# Patient Record
Sex: FEMALE | Race: WHITE | Employment: FULL TIME | URBAN - NONMETROPOLITAN AREA
[De-identification: names, ages, dates, MRNs, and addresses within clinical notes are randomized per-mention and may not be internally consistent; named-entity substitution may affect disease eponyms.]

---

## 2017-01-26 ENCOUNTER — OFFICE VISIT (OUTPATIENT)
Dept: FAMILY MEDICINE CLINIC | Facility: CLINIC | Age: 57
End: 2017-01-26

## 2017-01-26 VITALS
OXYGEN SATURATION: 97 % | SYSTOLIC BLOOD PRESSURE: 122 MMHG | TEMPERATURE: 98 F | HEART RATE: 84 BPM | DIASTOLIC BLOOD PRESSURE: 80 MMHG | BODY MASS INDEX: 29 KG/M2 | WEIGHT: 173 LBS

## 2017-01-26 DIAGNOSIS — Z56.6 STRESS AT WORK: ICD-10-CM

## 2017-01-26 DIAGNOSIS — K21.00 GASTROESOPHAGEAL REFLUX DISEASE WITH ESOPHAGITIS: Primary | ICD-10-CM

## 2017-01-26 PROCEDURE — 99213 OFFICE O/P EST LOW 20 MIN: CPT | Performed by: FAMILY MEDICINE

## 2017-01-26 RX ORDER — OMEPRAZOLE 40 MG/1
40 CAPSULE, DELAYED RELEASE ORAL DAILY
Qty: 30 CAPSULE | Refills: 1 | Status: SHIPPED | OUTPATIENT
Start: 2017-01-26 | End: 2018-03-08

## 2017-01-26 SDOH — HEALTH STABILITY - MENTAL HEALTH: OTHER PHYSICAL AND MENTAL STRAIN RELATED TO WORK: Z56.6

## 2017-01-26 NOTE — PATIENT INSTRUCTIONS
Anti-reflux measures reviewed. Avoid large meals. Allow at least 3 hrs between eating and laying down to facilitate gastric emptying. Limit caffeine to 2 servings per day. Avoid spicy or acidic foods and liquids. Moderation of alcohol.   Avoid nsaids an

## 2017-01-26 NOTE — PROGRESS NOTES
HPI:    Patient ID: Cassy Leroy is a 61year old female. Patient presents with:  Abdominal Pain: VOMITING, HEART BURN, GERD---- THINKS POSSIBLE ULCER--    HPI c/o stomach pains x 1 month, \"acidy\",  Pt using TUMS, acid up in throat, worse at night  Vomit reactive to light. No scleral icterus. Neck: Normal range of motion. Neck supple. No thyromegaly present. Cardiovascular: Normal rate, regular rhythm and normal heart sounds. No murmur heard.   Pulmonary/Chest: Effort normal and breath sounds normal.

## 2017-10-06 ENCOUNTER — TELEPHONE (OUTPATIENT)
Dept: FAMILY MEDICINE CLINIC | Facility: CLINIC | Age: 57
End: 2017-10-06

## 2017-10-06 NOTE — TELEPHONE ENCOUNTER
Mammogram normal.  Repeat in one year. Continue monthly self breast exams and follow up with any palpable or visible abnormality. Patient notified of results and recommendations.

## 2017-10-07 ENCOUNTER — OFFICE VISIT (OUTPATIENT)
Dept: FAMILY MEDICINE CLINIC | Facility: CLINIC | Age: 57
End: 2017-10-07

## 2017-10-07 VITALS
SYSTOLIC BLOOD PRESSURE: 122 MMHG | HEART RATE: 76 BPM | TEMPERATURE: 99 F | DIASTOLIC BLOOD PRESSURE: 72 MMHG | WEIGHT: 178 LBS | OXYGEN SATURATION: 98 % | BODY MASS INDEX: 30.02 KG/M2 | HEIGHT: 64.5 IN

## 2017-10-07 DIAGNOSIS — E55.9 VITAMIN D DEFICIENCY: ICD-10-CM

## 2017-10-07 DIAGNOSIS — Z01.419 WELL WOMAN EXAM WITH ROUTINE GYNECOLOGICAL EXAM: Primary | ICD-10-CM

## 2017-10-07 DIAGNOSIS — J30.1 CHRONIC SEASONAL ALLERGIC RHINITIS DUE TO POLLEN: ICD-10-CM

## 2017-10-07 DIAGNOSIS — L71.9 ROSACEA, ACNE: ICD-10-CM

## 2017-10-07 DIAGNOSIS — Z78.0 MENOPAUSE: ICD-10-CM

## 2017-10-07 DIAGNOSIS — I87.2 VENOUS INSUFFICIENCY OF BOTH LOWER EXTREMITIES: ICD-10-CM

## 2017-10-07 DIAGNOSIS — E03.9 ACQUIRED HYPOTHYROIDISM: ICD-10-CM

## 2017-10-07 DIAGNOSIS — K21.00 GASTROESOPHAGEAL REFLUX DISEASE WITH ESOPHAGITIS: ICD-10-CM

## 2017-10-07 DIAGNOSIS — G43.009 MIGRAINE WITHOUT AURA AND WITHOUT STATUS MIGRAINOSUS, NOT INTRACTABLE: ICD-10-CM

## 2017-10-07 PROCEDURE — 36415 COLL VENOUS BLD VENIPUNCTURE: CPT | Performed by: FAMILY MEDICINE

## 2017-10-07 PROCEDURE — 88175 CYTOPATH C/V AUTO FLUID REDO: CPT | Performed by: FAMILY MEDICINE

## 2017-10-07 PROCEDURE — 81003 URINALYSIS AUTO W/O SCOPE: CPT | Performed by: FAMILY MEDICINE

## 2017-10-07 PROCEDURE — 80050 GENERAL HEALTH PANEL: CPT | Performed by: FAMILY MEDICINE

## 2017-10-07 PROCEDURE — 82306 VITAMIN D 25 HYDROXY: CPT | Performed by: FAMILY MEDICINE

## 2017-10-07 PROCEDURE — 80061 LIPID PANEL: CPT | Performed by: FAMILY MEDICINE

## 2017-10-07 PROCEDURE — 99396 PREV VISIT EST AGE 40-64: CPT | Performed by: FAMILY MEDICINE

## 2017-10-07 RX ORDER — DOXYCYCLINE HYCLATE 100 MG/1
100 TABLET, DELAYED RELEASE ORAL 2 TIMES DAILY
Qty: 60 TABLET | Refills: 0 | Status: SHIPPED | OUTPATIENT
Start: 2017-10-07 | End: 2018-03-08

## 2017-10-07 RX ORDER — MONTELUKAST SODIUM 10 MG/1
10 TABLET ORAL DAILY
Qty: 90 TABLET | Refills: 3 | Status: SHIPPED | OUTPATIENT
Start: 2017-10-07 | End: 2018-10-02

## 2017-10-07 NOTE — H&P
HPI:   Cristóbal Hernandez is a 61year old female who presents for a complete physical exam. Symptoms: is menopausal. Patient complains of needing annual physical.  Doing better on omeprazole. Allergies flared up. On allegra D , nasacort and saline nasally.   Ro daily. Disp: 1 Inhaler Rfl: 0   Triamcinolone Acetonide (NASACORT AQ NA) by Nasal route.  Disp:  Rfl:       Past Medical History:   Diagnosis Date   • Acne rosacea 11/2/2012   • Allergic rhinitis 11/2/2012   • Migraines 11/2/2012   • Postmenopausal atrophic rosacea  HEENT: atraumatic, normocephalic,ears and throat are clear, nares pale boggy with clear secretions  EYES:PERRLA, EOMI, conjunctiva are clear  NECK: supple,no adenopathy,no bruits  CHEST: no chest tenderness  BREAST: no dominant or suspicious mass acne  - acne facial washes  - stop  Rubbing alcohol to face which can exacerbate  - Doxycycline Hyclate 100 MG Oral Tab EC; Take 1 tablet (100 mg total) by mouth 2 (two) times daily. Dispense: 60 tablet;  Refill: 0      Pt info handouts given for: exercise

## 2017-10-07 NOTE — PATIENT INSTRUCTIONS
Clinical Breast Exam    Many health organizations recommend a yearly clinical breast exam. This exam may be done by a gynecologist, family healthcare provider, nurse practitioner, or specially trained nurse.  Yearly breast exams help to make sure that mahesh · Not being active  · Drinking too much alcohol  · Having dense breast tissue  · Taking hormone therapy after menopause  Other health organizations have different recommendations. Talk to your healthcare provider about what is best for you.    Date Last Rev

## 2018-03-08 ENCOUNTER — OFFICE VISIT (OUTPATIENT)
Dept: FAMILY MEDICINE CLINIC | Facility: CLINIC | Age: 58
End: 2018-03-08

## 2018-03-08 VITALS
HEIGHT: 64.5 IN | SYSTOLIC BLOOD PRESSURE: 120 MMHG | WEIGHT: 180 LBS | TEMPERATURE: 98 F | BODY MASS INDEX: 30.36 KG/M2 | HEART RATE: 72 BPM | DIASTOLIC BLOOD PRESSURE: 68 MMHG | OXYGEN SATURATION: 98 %

## 2018-03-08 DIAGNOSIS — H93.13 TINNITUS OF BOTH EARS: ICD-10-CM

## 2018-03-08 DIAGNOSIS — M26.609 TMJ DYSFUNCTION: Primary | ICD-10-CM

## 2018-03-08 DIAGNOSIS — B07.8 OTHER VIRAL WARTS: ICD-10-CM

## 2018-03-08 PROCEDURE — 99213 OFFICE O/P EST LOW 20 MIN: CPT | Performed by: FAMILY MEDICINE

## 2018-03-08 PROCEDURE — 17110 DESTRUCTION B9 LES UP TO 14: CPT | Performed by: FAMILY MEDICINE

## 2018-03-08 NOTE — PROGRESS NOTES
HPI:    Patient ID: Nallely Wharton is a 61year old female.   Patient presents with:  Ear Pain: BILATERAL EAR PAIN, HA, RINGING IN EARS  Warts: ON HANDS    HPI c/o intermittent sharp pains in both ears x several weeks, no recent colds, +chronic allergies,get Tinnitus of both ears  Other viral warts-overlying callus debrided and warts treated with liquid nitrogen  Patient Instructions   Discussed the findings of bilateral TMJ dysfunction.   Would recommend soft diet, avoid gum or other form foods that require gr

## 2018-03-08 NOTE — PATIENT INSTRUCTIONS
Discussed the findings of bilateral TMJ dysfunction. Would recommend soft diet, avoid gum or other form foods that require grinding of teeth, he used warm compresses and ibuprofen as needed.   If symptoms persist follow-up with dentist  Discussed the cause

## 2018-03-30 ENCOUNTER — MED REC SCAN ONLY (OUTPATIENT)
Dept: FAMILY MEDICINE CLINIC | Facility: CLINIC | Age: 58
End: 2018-03-30

## 2018-08-10 ENCOUNTER — OFFICE VISIT (OUTPATIENT)
Dept: FAMILY MEDICINE CLINIC | Facility: CLINIC | Age: 58
End: 2018-08-10
Payer: COMMERCIAL

## 2018-08-10 VITALS
WEIGHT: 183.38 LBS | SYSTOLIC BLOOD PRESSURE: 110 MMHG | BODY MASS INDEX: 31 KG/M2 | TEMPERATURE: 99 F | OXYGEN SATURATION: 97 % | DIASTOLIC BLOOD PRESSURE: 74 MMHG | HEART RATE: 71 BPM

## 2018-08-10 DIAGNOSIS — J30.89 SEASONAL ALLERGIC RHINITIS DUE TO OTHER ALLERGIC TRIGGER: Primary | ICD-10-CM

## 2018-08-10 DIAGNOSIS — J45.901 BRONCHITIS, ALLERGIC, UNSPECIFIED ASTHMA SEVERITY, WITH ACUTE EXACERBATION: ICD-10-CM

## 2018-08-10 PROCEDURE — 99214 OFFICE O/P EST MOD 30 MIN: CPT | Performed by: FAMILY MEDICINE

## 2018-08-10 RX ORDER — PREDNISONE 20 MG/1
20 TABLET ORAL 2 TIMES DAILY
Qty: 14 TABLET | Refills: 0 | Status: SHIPPED | OUTPATIENT
Start: 2018-08-10 | End: 2018-08-17 | Stop reason: ALTCHOICE

## 2018-08-10 RX ORDER — BUDESONIDE AND FORMOTEROL FUMARATE DIHYDRATE 160; 4.5 UG/1; UG/1
2 AEROSOL RESPIRATORY (INHALATION) 2 TIMES DAILY
Qty: 1 INHALER | COMMUNITY
Start: 2018-08-10 | End: 2018-08-17

## 2018-08-10 NOTE — PROGRESS NOTES
Devin Mayer is a 60year old female. Patient presents with: Other: head congestion, headaches, trouble breathing-started approx 1.5 wks ago-has been taking cold and sinus OTC. ...room 2      HPI:   Patient had been on Singulair and Allegra and Asmanex. REVIEW OF SYSTEMS:   GENERAL HEALTH: feels well otherwise  SKIN: denies any unusual skin lesions or rashes  RESPIRATORY: denies shortness of breath   CARDIOVASCULAR: denies chest pain   GI: denies nausea, vomiting, diarrhea or abdominal pain   NEURO: d combinations, may need formal allergy testing. No orders of the defined types were placed in this encounter.       Meds & Refills for this Visit:  Signed Prescriptions Disp Refills    predniSONE 20 MG Oral Tab 14 tablet 0      Sig: Take 1 tablet (20 mg t

## 2018-08-15 ENCOUNTER — TELEPHONE (OUTPATIENT)
Dept: FAMILY MEDICINE CLINIC | Facility: CLINIC | Age: 58
End: 2018-08-15

## 2018-08-16 NOTE — TELEPHONE ENCOUNTER
Returned phone call to patient, she since starting Symbicort she has had a sore throat in increased throat congestion, and home peak flows are in the low 400's.  She states she has decreased the Symbicort to 1 puff BID. she would like to know if there is so

## 2018-08-16 NOTE — TELEPHONE ENCOUNTER
Called and spoke to patient notified of information, she would like to schedule appt to see Dr. Jaswinder Valdez. appt booked.   Future Appointments  Date Time Provider Stepan Ward   8/17/2018 3:30 PM Bee Pineda DO [facility]

## 2018-08-16 NOTE — TELEPHONE ENCOUNTER
She should stop the Symbicort. She should make an appointment to be seen to reevaluate and try to find a different medication.

## 2018-08-17 ENCOUNTER — OFFICE VISIT (OUTPATIENT)
Dept: FAMILY MEDICINE CLINIC | Facility: CLINIC | Age: 58
End: 2018-08-17
Payer: COMMERCIAL

## 2018-08-17 VITALS
BODY MASS INDEX: 31 KG/M2 | WEIGHT: 182.5 LBS | HEART RATE: 84 BPM | DIASTOLIC BLOOD PRESSURE: 80 MMHG | SYSTOLIC BLOOD PRESSURE: 130 MMHG | OXYGEN SATURATION: 98 % | TEMPERATURE: 98 F

## 2018-08-17 DIAGNOSIS — J45.909 BRONCHITIS, ALLERGIC, UNSPECIFIED ASTHMA SEVERITY, UNCOMPLICATED: Primary | ICD-10-CM

## 2018-08-17 PROCEDURE — 99213 OFFICE O/P EST LOW 20 MIN: CPT | Performed by: FAMILY MEDICINE

## 2018-08-17 NOTE — PROGRESS NOTES
Marlo Carter is a 60year old female. Patient presents with: Other: pt feels inhaler was contributing to symptoms. . sore  throat; chest congestion. . started Monday. . room 1      HPI:   Patient feels much better since stopping the Symbicort.   Cough is go Pulse 84   Temp 98.3 °F (36.8 °C) (Temporal)   Wt 182 lb 8 oz   SpO2 98%   BMI 30.84 kg/m²   GENERAL: well developed, well nourished,in no apparent distress  SKIN: no rashes,no suspicious lesions  HEENT: atraumatic, normocephalic, R TM normal, L TM normal,

## 2018-10-17 ENCOUNTER — LABORATORY ENCOUNTER (OUTPATIENT)
Dept: LAB | Age: 58
End: 2018-10-17
Attending: FAMILY MEDICINE
Payer: COMMERCIAL

## 2018-10-17 ENCOUNTER — OFFICE VISIT (OUTPATIENT)
Dept: FAMILY MEDICINE CLINIC | Facility: CLINIC | Age: 58
End: 2018-10-17
Payer: COMMERCIAL

## 2018-10-17 VITALS
WEIGHT: 185.38 LBS | HEIGHT: 65.25 IN | SYSTOLIC BLOOD PRESSURE: 118 MMHG | OXYGEN SATURATION: 95 % | BODY MASS INDEX: 30.52 KG/M2 | HEART RATE: 71 BPM | DIASTOLIC BLOOD PRESSURE: 80 MMHG | TEMPERATURE: 99 F

## 2018-10-17 DIAGNOSIS — Z78.0 MENOPAUSE: ICD-10-CM

## 2018-10-17 DIAGNOSIS — Z01.419 WELL WOMAN EXAM WITH ROUTINE GYNECOLOGICAL EXAM: ICD-10-CM

## 2018-10-17 DIAGNOSIS — E03.9 ACQUIRED HYPOTHYROIDISM: ICD-10-CM

## 2018-10-17 DIAGNOSIS — Z01.419 WELL WOMAN EXAM WITH ROUTINE GYNECOLOGICAL EXAM: Primary | ICD-10-CM

## 2018-10-17 DIAGNOSIS — G43.009 MIGRAINE WITHOUT AURA AND WITHOUT STATUS MIGRAINOSUS, NOT INTRACTABLE: ICD-10-CM

## 2018-10-17 DIAGNOSIS — M26.609 TMJ DYSFUNCTION: ICD-10-CM

## 2018-10-17 DIAGNOSIS — J30.1 CHRONIC SEASONAL ALLERGIC RHINITIS DUE TO POLLEN: ICD-10-CM

## 2018-10-17 DIAGNOSIS — N95.2 POSTMENOPAUSAL ATROPHIC VAGINITIS: ICD-10-CM

## 2018-10-17 PROCEDURE — 81001 URINALYSIS AUTO W/SCOPE: CPT | Performed by: FAMILY MEDICINE

## 2018-10-17 PROCEDURE — 82306 VITAMIN D 25 HYDROXY: CPT | Performed by: FAMILY MEDICINE

## 2018-10-17 PROCEDURE — 80050 GENERAL HEALTH PANEL: CPT | Performed by: FAMILY MEDICINE

## 2018-10-17 PROCEDURE — 36415 COLL VENOUS BLD VENIPUNCTURE: CPT | Performed by: FAMILY MEDICINE

## 2018-10-17 PROCEDURE — 99396 PREV VISIT EST AGE 40-64: CPT | Performed by: FAMILY MEDICINE

## 2018-10-17 PROCEDURE — 80061 LIPID PANEL: CPT | Performed by: FAMILY MEDICINE

## 2018-10-17 NOTE — PATIENT INSTRUCTIONS
Understanding Menopause  Menopause marks the point where you’ve gone 12 months in a row without a period. The average age for this is around 46, but it can happen at younger or older ages.  During the months or years before menopause, your body goes throu · Hot flashes. Wear layers that you can remove. Try all-cotton clothing, sheets, and blankets. Keep a glass of cold water by your bed. · Pain during sex. You can buy a water-based lubricant or vaginal moisturizer in the drugstore that may help.  Your healt · Hormone therapy (HT). HT increases the amount of estrogen in your body. This can help manage or relieve symptoms. HT can be given in pill form. It may be given as a lotion, cream, ring put into the vagina, or a patch on the skin.  The risks and benefits o © 8184-6618 [address]. [address] [facility], [facility] [place]. All rights reserved. This information is not intended as a substitute for professional medical care. Always follow your healthcare professional's instructions.

## 2018-10-17 NOTE — H&P
HPI:   Raymundo Garcia is a 60year old female who presents for a complete physical exam. Symptoms: is menopausal. Patient complains of needing flu shot no other concerns,.   Gets shot therapy      Immunization History  Administered            Date(s) Adminis Hypothyroidism   • Migraines Mother    • Hypertension Father    • Diabetes Father    • Other (gerd/ Morales's esophagus) Father    • Other (CAD) Other    • Heart Attack Other         MI   • Other (Germinoma) Other    • Migraines Sister         1 si not tender,FROM of the back  EXTREMITIES: no cyanosis, clubbing or edema  NEURO: Oriented times three,cranial nerves are intact,motor and sensory are grossly intact    ASSESSMENT AND PLAN:   Geovani Harvey is a 60year old female who presents for a complete

## 2018-10-19 ENCOUNTER — TELEPHONE (OUTPATIENT)
Dept: FAMILY MEDICINE CLINIC | Facility: CLINIC | Age: 58
End: 2018-10-19

## 2018-10-19 NOTE — TELEPHONE ENCOUNTER
----- Message from Tiffanie Gonzalez sent at 10/19/2018  4:11 PM CDT -----  Contact: PATIENT  LISANDRO-   RETURNED A CALL  [phone]      [facility] for the pt. catia

## 2018-11-20 ENCOUNTER — HOSPITAL ENCOUNTER (OUTPATIENT)
Dept: BONE DENSITY | Age: 58
Discharge: HOME | End: 2018-11-20
Attending: FAMILY MEDICINE
Payer: COMMERCIAL

## 2018-11-20 DIAGNOSIS — N95.2 POSTMENOPAUSAL ATROPHIC VAGINITIS: ICD-10-CM

## 2018-11-20 DIAGNOSIS — Z78.0 MENOPAUSE: ICD-10-CM

## 2018-11-20 PROCEDURE — 77080 DXA BONE DENSITY AXIAL: CPT | Performed by: FAMILY MEDICINE

## 2019-02-07 ENCOUNTER — TELEPHONE (OUTPATIENT)
Dept: FAMILY MEDICINE CLINIC | Facility: CLINIC | Age: 59
End: 2019-02-07

## 2019-02-07 NOTE — TELEPHONE ENCOUNTER
How about if I evaluate her first before we order a test.  Please have patient schedule an appointment

## 2019-02-07 NOTE — TELEPHONE ENCOUNTER
I don't see that she has been evaluated by you for this knee pain. Would like me to call her and have her schedule an appt? Please review. Thank you.

## 2019-02-11 ENCOUNTER — TELEPHONE (OUTPATIENT)
Dept: FAMILY MEDICINE CLINIC | Facility: CLINIC | Age: 59
End: 2019-02-11

## 2019-02-11 NOTE — TELEPHONE ENCOUNTER
PT HAVING PROBLEMS WITH KNEE SINCE MAY. SHE SAW A CHIROPRACTOR FOR SEVERAL MONTHS AND HE SUGGESTED GETTING AN MRI - IT MAY BE A MENISCUS PROBLEM. PT WANTS NEED TO MAKE AN APPT FIRST W/ DR Cristobal Bejarano TO GET AN MRI AND WHICH TYPE?   PLEASE CALL

## 2019-02-11 NOTE — TELEPHONE ENCOUNTER
L/m that this was addressed in a note last week.  DR. Triston Banegas SAID TO SCHEDULE AN OV FOR EVAL

## 2019-02-20 ENCOUNTER — HOSPITAL ENCOUNTER (OUTPATIENT)
Dept: GENERAL RADIOLOGY | Age: 59
Discharge: HOME | End: 2019-02-20
Attending: FAMILY MEDICINE
Payer: COMMERCIAL

## 2019-02-20 ENCOUNTER — OFFICE VISIT (OUTPATIENT)
Dept: FAMILY MEDICINE CLINIC | Facility: CLINIC | Age: 59
End: 2019-02-20
Payer: COMMERCIAL

## 2019-02-20 VITALS
SYSTOLIC BLOOD PRESSURE: 118 MMHG | WEIGHT: 185 LBS | HEART RATE: 68 BPM | BODY MASS INDEX: 30.82 KG/M2 | TEMPERATURE: 98 F | HEIGHT: 65 IN | OXYGEN SATURATION: 98 % | DIASTOLIC BLOOD PRESSURE: 80 MMHG

## 2019-02-20 DIAGNOSIS — G89.29 CHRONIC PAIN OF RIGHT KNEE: Primary | ICD-10-CM

## 2019-02-20 DIAGNOSIS — M25.561 CHRONIC PAIN OF RIGHT KNEE: ICD-10-CM

## 2019-02-20 DIAGNOSIS — M25.561 PATELLOFEMORAL ARTHRALGIA OF RIGHT KNEE: ICD-10-CM

## 2019-02-20 DIAGNOSIS — G89.29 CHRONIC PAIN OF RIGHT KNEE: ICD-10-CM

## 2019-02-20 DIAGNOSIS — M17.11 PRIMARY OSTEOARTHRITIS OF RIGHT KNEE: ICD-10-CM

## 2019-02-20 DIAGNOSIS — M25.561 CHRONIC PAIN OF RIGHT KNEE: Primary | ICD-10-CM

## 2019-02-20 PROCEDURE — 99213 OFFICE O/P EST LOW 20 MIN: CPT | Performed by: FAMILY MEDICINE

## 2019-02-20 PROCEDURE — 73562 X-RAY EXAM OF KNEE 3: CPT | Performed by: FAMILY MEDICINE

## 2019-02-20 NOTE — PROGRESS NOTES
HPI:    Patient ID: Emily Garcia is a 60year old female. Patient presents with:  Knee Pain: can't bend completely, exercising make's it worse.     Pt states she fell in May at school, hard to bend right knee all the way, pain is medial  Pt states she f person, place, and time. No sensory deficit. Reflex Scores:       Patellar reflexes are 2+ on the right side and 2+ on the left side. Achilles reflexes are 2+ on the right side and 2+ on the left side.   X-ray right knee:   Impression     CONCLUSION

## 2019-02-21 NOTE — PATIENT INSTRUCTIONS
I reviewed x-ray findings. I advised patient that she is likely having symptoms related to osteoarthritis and popliteal swelling.   There is also degree of patellofemoral arthritis  Patient was instructed in straight leg raising in short arc extension exer

## 2019-03-07 ENCOUNTER — OFFICE VISIT (OUTPATIENT)
Dept: FAMILY MEDICINE CLINIC | Facility: CLINIC | Age: 59
End: 2019-03-07
Payer: COMMERCIAL

## 2019-03-07 VITALS
SYSTOLIC BLOOD PRESSURE: 128 MMHG | TEMPERATURE: 99 F | DIASTOLIC BLOOD PRESSURE: 78 MMHG | OXYGEN SATURATION: 99 % | HEART RATE: 68 BPM | BODY MASS INDEX: 31 KG/M2 | WEIGHT: 184.5 LBS

## 2019-03-07 DIAGNOSIS — G56.21 ULNAR NEUROPATHY OF RIGHT UPPER EXTREMITY: Primary | ICD-10-CM

## 2019-03-07 PROCEDURE — 99213 OFFICE O/P EST LOW 20 MIN: CPT | Performed by: FAMILY MEDICINE

## 2019-03-07 RX ORDER — PREDNISONE 20 MG/1
TABLET ORAL
Qty: 30 TABLET | Refills: 0 | Status: SHIPPED | OUTPATIENT
Start: 2019-03-07 | End: 2019-04-01 | Stop reason: ALTCHOICE

## 2019-03-07 NOTE — PROGRESS NOTES
Adelia Payne is a 60year old female. Patient presents with:  Wrist Pain: wrist pain started yesterday. .. numbess and pain in right hand. . when pt turned steerling wheels. ..room 4    Subjective   HPI:   Patient has issues right hand  Had been using the c 128/78  02/20/19 : 118/80  12/27/18 : 120/81  10/17/18 : 118/80  08/17/18 : 130/80  08/10/18 : 110/74      Wt Readings from Last 6 Encounters:  03/07/19 : 184 lb 8 oz  02/20/19 : 185 lb  12/27/18 : 184 lb  10/17/18 : 185 lb 6 oz  08/17/18 : 182 lb 8 oz  08

## 2019-04-01 ENCOUNTER — OFFICE VISIT (OUTPATIENT)
Dept: FAMILY MEDICINE CLINIC | Facility: CLINIC | Age: 59
End: 2019-04-01
Payer: COMMERCIAL

## 2019-04-01 VITALS
SYSTOLIC BLOOD PRESSURE: 118 MMHG | BODY MASS INDEX: 30.66 KG/M2 | DIASTOLIC BLOOD PRESSURE: 72 MMHG | RESPIRATION RATE: 16 BRPM | HEIGHT: 65 IN | TEMPERATURE: 98 F | HEART RATE: 70 BPM | WEIGHT: 184 LBS

## 2019-04-01 DIAGNOSIS — M26.609 TMJ DYSFUNCTION: ICD-10-CM

## 2019-04-01 DIAGNOSIS — E66.9 OBESITY (BMI 30.0-34.9): ICD-10-CM

## 2019-04-01 DIAGNOSIS — D72.829 LEUKOCYTOSIS, UNSPECIFIED TYPE: ICD-10-CM

## 2019-04-01 DIAGNOSIS — G44.209 MUSCLE TENSION HEADACHE: ICD-10-CM

## 2019-04-01 DIAGNOSIS — J02.9 SORE THROAT: Primary | ICD-10-CM

## 2019-04-01 DIAGNOSIS — R73.03 PRE-DIABETES: ICD-10-CM

## 2019-04-01 PROCEDURE — 87880 STREP A ASSAY W/OPTIC: CPT | Performed by: FAMILY MEDICINE

## 2019-04-01 PROCEDURE — 87081 CULTURE SCREEN ONLY: CPT | Performed by: FAMILY MEDICINE

## 2019-04-01 PROCEDURE — 99214 OFFICE O/P EST MOD 30 MIN: CPT | Performed by: FAMILY MEDICINE

## 2019-04-01 NOTE — PROGRESS NOTES
Marlo Carter is a 60year old female. Patient presents with: Follow - Up: patient is here to review recent labs.        HPI:   Pt had labs done 3/20/19  Pt reports she didn't feel good the day of lab draw  Now c/o st x several days, no fever, + chills, no with exertion,cough, wheezing  CARDIOVASCULAR: denies chest pain on exertion, edema  GI: denies abdominal pain and denies heartburn  NEURO: +freq temporal headaches  PSYCH: + stressed and anxious    EXAM:   /72   Pulse 70   Temp 97.5 °F (36.4 °C) (Te 6.0.  I discussed the importance of lower carbohydrate food choices, eating behavior modification and daily aerobic activity with a goal of 15 pound weight loss over the next 3 months. I have asked the patient to repeat hemoglobin A1c in 3-6 months.   I re

## 2019-04-01 NOTE — PATIENT INSTRUCTIONS
I reviewed the results of patient's recent labs. I discussed the elevated white count possibly being related to con committed illness at the time of blood draw. I reviewed goals for lipids.   I advised patient she has a very favorable of total to good cho

## 2019-04-02 ENCOUNTER — MED REC SCAN ONLY (OUTPATIENT)
Dept: FAMILY MEDICINE CLINIC | Facility: CLINIC | Age: 59
End: 2019-04-02

## 2019-04-03 RX ORDER — AMOXICILLIN 875 MG/1
875 TABLET, COATED ORAL 2 TIMES DAILY
Qty: 20 TABLET | Refills: 0 | Status: SHIPPED | OUTPATIENT
Start: 2019-04-03 | End: 2019-04-13

## 2019-06-14 PROBLEM — D12.3 BENIGN NEOPLASM OF TRANSVERSE COLON: Status: ACTIVE | Noted: 2019-06-14

## 2019-06-14 PROBLEM — K64.8 OTHER HEMORRHOIDS: Status: ACTIVE | Noted: 2019-06-14

## 2019-06-14 PROBLEM — K21.9 GASTROESOPHAGEAL REFLUX DISEASE WITHOUT ESOPHAGITIS: Status: ACTIVE | Noted: 2019-06-14

## 2019-06-14 PROBLEM — Z83.71 FAMILY HISTORY OF COLONIC POLYPS: Status: ACTIVE | Noted: 2019-06-14

## 2019-06-14 PROBLEM — K63.5 POLYP OF COLON: Status: ACTIVE | Noted: 2019-06-14

## 2019-06-14 PROBLEM — D12.8 BENIGN NEOPLASM OF RECTUM: Status: ACTIVE | Noted: 2019-06-14

## 2019-06-14 PROBLEM — D12.5 BENIGN NEOPLASM OF SIGMOID COLON: Status: ACTIVE | Noted: 2019-06-14

## 2019-06-14 PROBLEM — Z83.719 FAMILY HISTORY OF COLONIC POLYPS: Status: ACTIVE | Noted: 2019-06-14

## 2019-06-14 PROBLEM — Z80.0 FAMILY HISTORY OF MALIGNANT NEOPLASM OF DIGESTIVE ORGAN: Status: ACTIVE | Noted: 2019-06-14

## 2019-08-02 ENCOUNTER — LABORATORY ENCOUNTER (OUTPATIENT)
Dept: LAB | Age: 59
End: 2019-08-02
Attending: FAMILY MEDICINE
Payer: COMMERCIAL

## 2019-08-02 ENCOUNTER — OFFICE VISIT (OUTPATIENT)
Dept: FAMILY MEDICINE CLINIC | Facility: CLINIC | Age: 59
End: 2019-08-02
Payer: COMMERCIAL

## 2019-08-02 VITALS
RESPIRATION RATE: 16 BRPM | SYSTOLIC BLOOD PRESSURE: 118 MMHG | HEART RATE: 80 BPM | OXYGEN SATURATION: 98 % | WEIGHT: 177 LBS | BODY MASS INDEX: 29.49 KG/M2 | HEIGHT: 65 IN | DIASTOLIC BLOOD PRESSURE: 70 MMHG | TEMPERATURE: 98 F

## 2019-08-02 DIAGNOSIS — D72.829 LEUKOCYTOSIS, UNSPECIFIED TYPE: ICD-10-CM

## 2019-08-02 DIAGNOSIS — R73.03 PRE-DIABETES: Primary | ICD-10-CM

## 2019-08-02 DIAGNOSIS — D72.829 LEUKOCYTOSIS (LEUCOCYTOSIS): Primary | ICD-10-CM

## 2019-08-02 LAB
BASOPHILS # BLD AUTO: 0.06 X10(3) UL (ref 0–0.2)
BASOPHILS NFR BLD AUTO: 1 %
DEPRECATED RDW RBC AUTO: 43.6 FL (ref 35.1–46.3)
EOSINOPHIL # BLD AUTO: 0.13 X10(3) UL (ref 0–0.7)
EOSINOPHIL NFR BLD AUTO: 2.2 %
ERYTHROCYTE [DISTWIDTH] IN BLOOD BY AUTOMATED COUNT: 13.2 % (ref 11–15)
EST. AVERAGE GLUCOSE BLD GHB EST-MCNC: 126 MG/DL (ref 68–126)
HBA1C MFR BLD HPLC: 6 % (ref ?–5.7)
HCT VFR BLD AUTO: 43.1 % (ref 35–48)
HGB BLD-MCNC: 14.2 G/DL (ref 12–16)
HGBA1C: 6
IMM GRANULOCYTES # BLD AUTO: 0 X10(3) UL (ref 0–1)
IMM GRANULOCYTES NFR BLD: 0 %
LYMPHOCYTES # BLD AUTO: 1.48 X10(3) UL (ref 1–4)
LYMPHOCYTES NFR BLD AUTO: 24.8 %
MCH RBC QN AUTO: 30 PG (ref 26–34)
MCHC RBC AUTO-ENTMCNC: 32.9 G/DL (ref 31–37)
MCV RBC AUTO: 90.9 FL (ref 80–100)
MONOCYTES # BLD AUTO: 0.43 X10(3) UL (ref 0.1–1)
MONOCYTES NFR BLD AUTO: 7.2 %
NEUTROPHILS # BLD AUTO: 3.86 X10 (3) UL (ref 1.5–7.7)
NEUTROPHILS # BLD AUTO: 3.86 X10(3) UL (ref 1.5–7.7)
NEUTROPHILS NFR BLD AUTO: 64.8 %
PLATELET # BLD AUTO: 317 10(3)UL (ref 150–450)
RBC # BLD AUTO: 4.74 X10(6)UL (ref 3.8–5.3)
WBC # BLD AUTO: 6 X10(3) UL (ref 4–11)

## 2019-08-02 PROCEDURE — 36415 COLL VENOUS BLD VENIPUNCTURE: CPT | Performed by: FAMILY MEDICINE

## 2019-08-02 PROCEDURE — 85025 COMPLETE CBC W/AUTO DIFF WBC: CPT | Performed by: FAMILY MEDICINE

## 2019-08-02 PROCEDURE — 99213 OFFICE O/P EST LOW 20 MIN: CPT | Performed by: FAMILY MEDICINE

## 2019-08-02 PROCEDURE — 83036 HEMOGLOBIN GLYCOSYLATED A1C: CPT | Performed by: FAMILY MEDICINE

## 2019-08-02 NOTE — PATIENT INSTRUCTIONS
I reviewed goals for blood sugar as well as hemoglobin A1c.   Discussed the importance of lower carbohydrate food choices, eating behavior modification and increase daily aerobic activity with goal of weight reduction as the foundation for prevention of felicia

## 2019-08-02 NOTE — PROGRESS NOTES
HPI:    Patient ID: Dorothy Juan is a 60year old female. Patient presents with:  Pre-diabetes: f/u    Patient had labs done in March that showed hemoglobin A1c of 6.0, patient also had elevated WBC but was sick at the time.   Patient is here for follow supple. Cardiovascular: Normal rate, regular rhythm, normal heart sounds and intact distal pulses. No murmur heard. Pulmonary/Chest: Effort normal and breath sounds normal.   Neurological: She is alert and oriented to person, place, and time.    Skin:

## 2019-09-29 ENCOUNTER — PATIENT MESSAGE (OUTPATIENT)
Dept: FAMILY MEDICINE CLINIC | Facility: CLINIC | Age: 59
End: 2019-09-29

## 2019-09-30 ENCOUNTER — PATIENT MESSAGE (OUTPATIENT)
Dept: FAMILY MEDICINE CLINIC | Facility: CLINIC | Age: 59
End: 2019-09-30

## 2019-09-30 NOTE — TELEPHONE ENCOUNTER
From: Daniel John  To: Ivon Montgomery., DO  Sent: 9/29/2019 10:19 AM CDT  Subject: Non-Urgent Medical Question    What is my blood type? My niece was just diagnosed with Acute Lymphoblastic Leukemia. It's devastating news.  She has a 20 mo. old child

## 2019-09-30 NOTE — TELEPHONE ENCOUNTER
From: Rajeev Schmidt  To: Jim Zuleta., DO  Sent: 9/30/2019 10:00 AM CDT  Subject: Non-Urgent Medical Question    I just had a colonoscopy and EGD. Would they have my blood type in Dr. Sheron Claude office?      I've had no surgeries since my csection in 1

## 2019-09-30 NOTE — TELEPHONE ENCOUNTER
Please advise patient that I have no idea what her blood type is. If she donates blood, the blood bank would have it  If she has had surgeries in the past, it is possible the hospital may have her blood type.   I am sorry to hear about her knees

## 2019-10-22 ENCOUNTER — OFFICE VISIT (OUTPATIENT)
Dept: FAMILY MEDICINE CLINIC | Facility: CLINIC | Age: 59
End: 2019-10-22
Payer: COMMERCIAL

## 2019-10-22 ENCOUNTER — APPOINTMENT (OUTPATIENT)
Dept: LAB | Age: 59
End: 2019-10-22
Attending: FAMILY MEDICINE
Payer: COMMERCIAL

## 2019-10-22 VITALS
SYSTOLIC BLOOD PRESSURE: 120 MMHG | TEMPERATURE: 97 F | HEART RATE: 72 BPM | BODY MASS INDEX: 29.92 KG/M2 | DIASTOLIC BLOOD PRESSURE: 80 MMHG | HEIGHT: 64 IN | WEIGHT: 175.25 LBS

## 2019-10-22 DIAGNOSIS — J30.89 SEASONAL ALLERGIC RHINITIS DUE TO OTHER ALLERGIC TRIGGER: ICD-10-CM

## 2019-10-22 DIAGNOSIS — K63.5 POLYP OF COLON, UNSPECIFIED PART OF COLON, UNSPECIFIED TYPE: ICD-10-CM

## 2019-10-22 DIAGNOSIS — M25.561 PATELLOFEMORAL ARTHRALGIA OF RIGHT KNEE: ICD-10-CM

## 2019-10-22 DIAGNOSIS — L71.9 ROSACEA, ACNE: ICD-10-CM

## 2019-10-22 DIAGNOSIS — M17.11 PRIMARY OSTEOARTHRITIS OF RIGHT KNEE: ICD-10-CM

## 2019-10-22 DIAGNOSIS — Z23 NEED FOR VACCINATION: ICD-10-CM

## 2019-10-22 DIAGNOSIS — Z01.419 WELL WOMAN EXAM WITH ROUTINE GYNECOLOGICAL EXAM: ICD-10-CM

## 2019-10-22 DIAGNOSIS — R73.03 PRE-DIABETES: ICD-10-CM

## 2019-10-22 DIAGNOSIS — K21.9 GASTROESOPHAGEAL REFLUX DISEASE WITHOUT ESOPHAGITIS: ICD-10-CM

## 2019-10-22 DIAGNOSIS — Z01.419 WELL WOMAN EXAM WITH ROUTINE GYNECOLOGICAL EXAM: Primary | ICD-10-CM

## 2019-10-22 PROBLEM — Z80.0 FAMILY HISTORY OF MALIGNANT NEOPLASM OF DIGESTIVE ORGAN: Status: RESOLVED | Noted: 2019-06-14 | Resolved: 2019-10-22

## 2019-10-22 PROCEDURE — 99396 PREV VISIT EST AGE 40-64: CPT | Performed by: FAMILY MEDICINE

## 2019-10-22 PROCEDURE — 87624 HPV HI-RISK TYP POOLED RSLT: CPT | Performed by: FAMILY MEDICINE

## 2019-10-22 PROCEDURE — 90471 IMMUNIZATION ADMIN: CPT | Performed by: FAMILY MEDICINE

## 2019-10-22 PROCEDURE — 88175 CYTOPATH C/V AUTO FLUID REDO: CPT | Performed by: FAMILY MEDICINE

## 2019-10-22 PROCEDURE — 90670 PCV13 VACCINE IM: CPT | Performed by: FAMILY MEDICINE

## 2019-10-22 PROCEDURE — 36415 COLL VENOUS BLD VENIPUNCTURE: CPT | Performed by: FAMILY MEDICINE

## 2019-10-22 PROCEDURE — 80053 COMPREHEN METABOLIC PANEL: CPT | Performed by: FAMILY MEDICINE

## 2019-10-22 PROCEDURE — 84443 ASSAY THYROID STIM HORMONE: CPT | Performed by: FAMILY MEDICINE

## 2019-10-22 NOTE — H&P
HPI:   Gordon Ng is a 60year old female who presents for a complete physical exam. Symptoms: is menopausal. Patient complains of needing physical.  States has allergy to casein, cows milk, eggs- yolk and white. Done by chiropractor.   Since has amna Migraines 2012   • Postmenopausal atrophic vaginitis 2012   • Reactive airway disease 2012   • Venous insufficiency of leg 2012      Past Surgical History:   Procedure Laterality Date   •      • COLONOSCOPY  09   • COLONOS kg/m².   GENERAL: well developed, well nourished,in no apparent distress  SKIN: no rashes,no suspicious lesions  HEENT: atraumatic, normocephalic,ears and throat are clear  EYES:PERRLA, EOMI,conjunctiva are clear  NECK: supple,no adenopathy,no bruits  CHES minutes three times weekly. The patient indicates understanding of these issues and agrees to the plan. The patient is asked to return for CPX in 1 year.

## 2019-10-23 ENCOUNTER — MED REC SCAN ONLY (OUTPATIENT)
Dept: FAMILY MEDICINE CLINIC | Facility: CLINIC | Age: 59
End: 2019-10-23

## 2019-10-24 ENCOUNTER — TELEPHONE (OUTPATIENT)
Dept: FAMILY MEDICINE CLINIC | Facility: CLINIC | Age: 59
End: 2019-10-24

## 2019-10-24 DIAGNOSIS — Z01.419 WELL WOMAN EXAM WITH ROUTINE GYNECOLOGICAL EXAM: Primary | ICD-10-CM

## 2020-01-11 ENCOUNTER — PATIENT MESSAGE (OUTPATIENT)
Dept: FAMILY MEDICINE CLINIC | Facility: CLINIC | Age: 60
End: 2020-01-11

## 2020-01-11 NOTE — TELEPHONE ENCOUNTER
From: Ger Gibbnos  To: Stacia Lana Joselyn Goodell, DO  Sent: 1/11/2020 9:52 AM CST  Subject: Visit Follow-up Question    I have a free wellness screening that includes A1C testing at the end of February. I get results in 3 business days.  I'd like to wait and rachna

## 2020-03-03 ENCOUNTER — PATIENT MESSAGE (OUTPATIENT)
Dept: FAMILY MEDICINE CLINIC | Facility: CLINIC | Age: 60
End: 2020-03-03

## 2020-03-10 ENCOUNTER — OFFICE VISIT (OUTPATIENT)
Dept: FAMILY MEDICINE CLINIC | Facility: CLINIC | Age: 60
End: 2020-03-10
Payer: COMMERCIAL

## 2020-03-10 VITALS
TEMPERATURE: 97 F | OXYGEN SATURATION: 99 % | DIASTOLIC BLOOD PRESSURE: 70 MMHG | SYSTOLIC BLOOD PRESSURE: 102 MMHG | HEART RATE: 71 BPM | BODY MASS INDEX: 30 KG/M2 | WEIGHT: 176 LBS

## 2020-03-10 DIAGNOSIS — R73.03 PRE-DIABETES: Primary | ICD-10-CM

## 2020-03-10 DIAGNOSIS — E66.9 OBESITY (BMI 30.0-34.9): ICD-10-CM

## 2020-03-10 PROCEDURE — 99213 OFFICE O/P EST LOW 20 MIN: CPT | Performed by: FAMILY MEDICINE

## 2020-03-10 NOTE — PATIENT INSTRUCTIONS
I reviewed goals for fasting blood sugar as well as hemoglobin A1c. I discussed the importance of daily aerobic activity totaling approximately 180 minutes weekly  Discussed importance of lower carbohydrate food choices including starches.   Discussed owne

## 2020-03-10 NOTE — PROGRESS NOTES
HPI:    Patient ID: Charan Estrada is a 60year old female.     Patient presents with:  Pre-diabetes: f/u    Weight unchanged since last visit  Pt avoiding eggs and dairy only, lots of stressors w/ niece w/ leukemia  Not very active, trying to get 7500 steps oriented to person, place, and time. She appears well-nourished. No distress. Neck: Normal range of motion. Neck supple. No thyromegaly present. Cardiovascular: Normal rate, regular rhythm, normal heart sounds and intact distal pulses.    No murmur hear

## 2020-06-25 ENCOUNTER — PATIENT MESSAGE (OUTPATIENT)
Dept: FAMILY MEDICINE CLINIC | Facility: CLINIC | Age: 60
End: 2020-06-25

## 2020-06-25 RX ORDER — CLOBETASOL PROPIONATE 0.5 MG/G
1 OINTMENT TOPICAL 2 TIMES DAILY
Qty: 15 G | Refills: 0 | Status: SHIPPED | OUTPATIENT
Start: 2020-06-25

## 2020-06-25 RX ORDER — MINOCYCLINE HYDROCHLORIDE 100 MG/1
100 CAPSULE ORAL DAILY
Qty: 30 CAPSULE | Refills: 0 | Status: SHIPPED | OUTPATIENT
Start: 2020-06-25

## 2020-06-25 NOTE — TELEPHONE ENCOUNTER
From: Benton Romberg  To: Vi Licea DO  Sent: 6/25/2020 6:52 AM CDT  Subject: Prescription Question    I need a refill for Clobestal Propionate. I'm breaking out in poison ivy as b.d I only have a small amount left to keep on top of it.      I also need

## 2021-01-14 NOTE — H&P
HPI:   Sera Solano is a 63year old female who presents for a complete physical exam. Symptoms: is menopausal. Patient complains of needing annual physical. Works in [employer]. .       Immunization History  Administered            Date(s) Admin by mouth. • Minocycline HCl 100 MG Oral Cap Take 1 capsule (100 mg total) by mouth daily. (Patient not taking: Reported on 1/15/2021 ) 30 capsule 0   • Triamcinolone Acetonide (NASACORT AQ NA) by Nasal route.         Past Medical History:   Diagnosis Da pain  NEURO: denies headaches  PSYCHE: denies depression or anxiety  HEMATOLOGIC: denies hx of anemia  ENDOCRINE: denies thyroid history  ALL/ASTHMA: denies hx of allergy or asthma    EXAM:   /78   Pulse 72   Temp 98.1 °F (36.7 °C) (Temporal)   Resp consider in the future  - TETANUS, DIPHTHERIA TOXOIDS AND ACELLULAR PERTUSIS VACCINE (TDAP), >7 YEARS, IM USE  - PNEUMOCOCCAL IMM (PNEUMOVAX)  - IMMUNIZATION ADMINISTRATION    8.  [place]  - flagyl gel bid         Pt info handouts given for: exercise, low f

## 2021-01-15 ENCOUNTER — LABORATORY ENCOUNTER (OUTPATIENT)
Dept: LAB | Age: 61
End: 2021-01-15
Attending: FAMILY MEDICINE
Payer: COMMERCIAL

## 2021-01-15 ENCOUNTER — TELEPHONE (OUTPATIENT)
Dept: FAMILY MEDICINE CLINIC | Facility: CLINIC | Age: 61
End: 2021-01-15

## 2021-01-15 ENCOUNTER — OFFICE VISIT (OUTPATIENT)
Dept: FAMILY MEDICINE CLINIC | Facility: CLINIC | Age: 61
End: 2021-01-15
Payer: COMMERCIAL

## 2021-01-15 VITALS
DIASTOLIC BLOOD PRESSURE: 78 MMHG | SYSTOLIC BLOOD PRESSURE: 112 MMHG | WEIGHT: 173.38 LBS | BODY MASS INDEX: 29.6 KG/M2 | HEART RATE: 72 BPM | HEIGHT: 64 IN | TEMPERATURE: 98 F | RESPIRATION RATE: 12 BRPM

## 2021-01-15 DIAGNOSIS — K21.9 GASTROESOPHAGEAL REFLUX DISEASE WITHOUT ESOPHAGITIS: ICD-10-CM

## 2021-01-15 DIAGNOSIS — J30.89 SEASONAL ALLERGIC RHINITIS DUE TO OTHER ALLERGIC TRIGGER: ICD-10-CM

## 2021-01-15 DIAGNOSIS — R73.03 PRE-DIABETES: ICD-10-CM

## 2021-01-15 DIAGNOSIS — L71.9 ROSACEA: ICD-10-CM

## 2021-01-15 DIAGNOSIS — Z23 NEED FOR VACCINATION: ICD-10-CM

## 2021-01-15 DIAGNOSIS — Z01.419 WELL WOMAN EXAM WITH ROUTINE GYNECOLOGICAL EXAM: Primary | ICD-10-CM

## 2021-01-15 DIAGNOSIS — M17.11 PRIMARY OSTEOARTHRITIS OF RIGHT KNEE: ICD-10-CM

## 2021-01-15 DIAGNOSIS — Z01.419 WELL WOMAN EXAM WITH ROUTINE GYNECOLOGICAL EXAM: ICD-10-CM

## 2021-01-15 DIAGNOSIS — K63.5 POLYP OF COLON, UNSPECIFIED PART OF COLON, UNSPECIFIED TYPE: ICD-10-CM

## 2021-01-15 LAB
ALBUMIN SERPL-MCNC: 4 G/DL (ref 3.4–5)
ALBUMIN/GLOB SERPL: 1.1 {RATIO} (ref 1–2)
ALP LIVER SERPL-CCNC: 77 U/L
ALT SERPL-CCNC: 25 U/L
ANION GAP SERPL CALC-SCNC: 4 MMOL/L (ref 0–18)
AST SERPL-CCNC: 21 U/L (ref 15–37)
BASOPHILS # BLD AUTO: 0.07 X10(3) UL (ref 0–0.2)
BASOPHILS NFR BLD AUTO: 1.1 %
BILIRUB SERPL-MCNC: 0.6 MG/DL (ref 0.1–2)
BUN BLD-MCNC: 16 MG/DL (ref 7–18)
BUN/CREAT SERPL: 16.3 (ref 10–20)
CALCIUM BLD-MCNC: 9.9 MG/DL (ref 8.5–10.1)
CHLORIDE SERPL-SCNC: 106 MMOL/L (ref 98–112)
CHOLEST SMN-MCNC: 205 MG/DL (ref ?–200)
CO2 SERPL-SCNC: 28 MMOL/L (ref 21–32)
CREAT BLD-MCNC: 0.98 MG/DL
DEPRECATED RDW RBC AUTO: 44.3 FL (ref 35.1–46.3)
EOSINOPHIL # BLD AUTO: 0.57 X10(3) UL (ref 0–0.7)
EOSINOPHIL NFR BLD AUTO: 8.9 %
ERYTHROCYTE [DISTWIDTH] IN BLOOD BY AUTOMATED COUNT: 12.8 % (ref 11–15)
EST. AVERAGE GLUCOSE BLD GHB EST-MCNC: 123 MG/DL (ref 68–126)
GLOBULIN PLAS-MCNC: 3.8 G/DL (ref 2.8–4.4)
GLUCOSE BLD-MCNC: 80 MG/DL (ref 70–99)
HBA1C MFR BLD HPLC: 5.9 % (ref ?–5.7)
HCT VFR BLD AUTO: 42.6 %
HDLC SERPL-MCNC: 67 MG/DL (ref 40–59)
HGB BLD-MCNC: 13.7 G/DL
IMM GRANULOCYTES # BLD AUTO: 0.01 X10(3) UL (ref 0–1)
IMM GRANULOCYTES NFR BLD: 0.2 %
LDLC SERPL CALC-MCNC: 129 MG/DL (ref ?–100)
LYMPHOCYTES # BLD AUTO: 1.79 X10(3) UL (ref 1–4)
LYMPHOCYTES NFR BLD AUTO: 28.1 %
M PROTEIN MFR SERPL ELPH: 7.8 G/DL (ref 6.4–8.2)
MCH RBC QN AUTO: 30.6 PG (ref 26–34)
MCHC RBC AUTO-ENTMCNC: 32.2 G/DL (ref 31–37)
MCV RBC AUTO: 95.3 FL
MONOCYTES # BLD AUTO: 0.44 X10(3) UL (ref 0.1–1)
MONOCYTES NFR BLD AUTO: 6.9 %
NEUTROPHILS # BLD AUTO: 3.5 X10 (3) UL (ref 1.5–7.7)
NEUTROPHILS # BLD AUTO: 3.5 X10(3) UL (ref 1.5–7.7)
NEUTROPHILS NFR BLD AUTO: 54.8 %
NONHDLC SERPL-MCNC: 138 MG/DL (ref ?–130)
OSMOLALITY SERPL CALC.SUM OF ELEC: 286 MOSM/KG (ref 275–295)
PATIENT FASTING Y/N/NP: YES
PATIENT FASTING Y/N/NP: YES
PLATELET # BLD AUTO: 306 10(3)UL (ref 150–450)
POTASSIUM SERPL-SCNC: 4.2 MMOL/L (ref 3.5–5.1)
RBC # BLD AUTO: 4.47 X10(6)UL
SODIUM SERPL-SCNC: 138 MMOL/L (ref 136–145)
TRIGL SERPL-MCNC: 47 MG/DL (ref 30–149)
TSI SER-ACNC: 2.5 MIU/ML (ref 0.36–3.74)
VLDLC SERPL CALC-MCNC: 9 MG/DL (ref 0–30)
WBC # BLD AUTO: 6.4 X10(3) UL (ref 4–11)

## 2021-01-15 PROCEDURE — 3008F BODY MASS INDEX DOCD: CPT | Performed by: FAMILY MEDICINE

## 2021-01-15 PROCEDURE — 3074F SYST BP LT 130 MM HG: CPT | Performed by: FAMILY MEDICINE

## 2021-01-15 PROCEDURE — 99396 PREV VISIT EST AGE 40-64: CPT | Performed by: FAMILY MEDICINE

## 2021-01-15 PROCEDURE — 90732 PPSV23 VACC 2 YRS+ SUBQ/IM: CPT | Performed by: FAMILY MEDICINE

## 2021-01-15 PROCEDURE — 3078F DIAST BP <80 MM HG: CPT | Performed by: FAMILY MEDICINE

## 2021-01-15 PROCEDURE — 90472 IMMUNIZATION ADMIN EACH ADD: CPT | Performed by: FAMILY MEDICINE

## 2021-01-15 PROCEDURE — 80061 LIPID PANEL: CPT | Performed by: FAMILY MEDICINE

## 2021-01-15 PROCEDURE — 83036 HEMOGLOBIN GLYCOSYLATED A1C: CPT | Performed by: FAMILY MEDICINE

## 2021-01-15 PROCEDURE — 90471 IMMUNIZATION ADMIN: CPT | Performed by: FAMILY MEDICINE

## 2021-01-15 PROCEDURE — 36415 COLL VENOUS BLD VENIPUNCTURE: CPT | Performed by: FAMILY MEDICINE

## 2021-01-15 PROCEDURE — 80050 GENERAL HEALTH PANEL: CPT | Performed by: FAMILY MEDICINE

## 2021-01-15 PROCEDURE — 90715 TDAP VACCINE 7 YRS/> IM: CPT | Performed by: FAMILY MEDICINE

## 2021-01-15 RX ORDER — METRONIDAZOLE 10 MG/G
1 GEL TOPICAL 2 TIMES DAILY
Qty: 30 G | Refills: 3 | Status: SHIPPED | OUTPATIENT
Start: 2021-01-15 | End: 2021-05-15

## 2021-01-15 NOTE — TELEPHONE ENCOUNTER
Kelly Kalpesh your Mammogram normal. Repeat in 1 year. Continue monthly self breast exam. Follow up with any palpable or visible abnormality.

## 2021-01-16 ENCOUNTER — TELEPHONE (OUTPATIENT)
Dept: FAMILY MEDICINE CLINIC | Facility: CLINIC | Age: 61
End: 2021-01-16

## 2021-03-18 ENCOUNTER — HOSPITAL ENCOUNTER (OUTPATIENT)
Age: 61
Discharge: HOME | End: 2021-03-18
Payer: COMMERCIAL

## 2021-03-18 VITALS
SYSTOLIC BLOOD PRESSURE: 149 MMHG | OXYGEN SATURATION: 97 % | RESPIRATION RATE: 16 BRPM | DIASTOLIC BLOOD PRESSURE: 87 MMHG | HEART RATE: 58 BPM | TEMPERATURE: 99 F

## 2021-03-18 DIAGNOSIS — N30.01 ACUTE CYSTITIS WITH HEMATURIA: Primary | ICD-10-CM

## 2021-03-18 LAB
POCT BILIRUBIN URINE: NEGATIVE
POCT GLUCOSE URINE: NEGATIVE MG/DL
POCT KETONE URINE: NEGATIVE MG/DL
POCT NITRITE URINE: NEGATIVE
POCT PH URINE: 7 (ref 5–8)
POCT PROTEIN URINE: NEGATIVE MG/DL
POCT SPECIFIC GRAVITY URINE: 1.02
POCT URINE CLARITY: CLEAR
POCT UROBILINOGEN URINE: 0.2 MG/DL

## 2021-03-18 PROCEDURE — 81002 URINALYSIS NONAUTO W/O SCOPE: CPT | Performed by: NURSE PRACTITIONER

## 2021-03-18 PROCEDURE — 87086 URINE CULTURE/COLONY COUNT: CPT | Performed by: NURSE PRACTITIONER

## 2021-03-18 PROCEDURE — 99203 OFFICE O/P NEW LOW 30 MIN: CPT | Performed by: NURSE PRACTITIONER

## 2021-03-18 PROCEDURE — 87077 CULTURE AEROBIC IDENTIFY: CPT | Performed by: NURSE PRACTITIONER

## 2021-03-18 RX ORDER — CEPHALEXIN 500 MG/1
500 CAPSULE ORAL 2 TIMES DAILY
Qty: 14 CAPSULE | Refills: 0 | Status: SHIPPED | OUTPATIENT
Start: 2021-03-18 | End: 2021-03-25

## 2021-03-18 NOTE — ED PROVIDER NOTES
Patient Seen in: [facility]      History   Patient presents with:  UTI    Stated Complaint: Urinary Issues    HPI/Subjective:   48-year-old female presents to the IC with complaints of increased urine frequency urgency and burning for last cou noted above.     Physical Exam     ED Triage Vitals [03/18/21 1659]   /87   Pulse 58   Resp 16   Temp 98.9 °F (37.2 °C)   Temp src Temporal   SpO2 97 %   O2 Device None (Room air)       Current:/87   Pulse 58   Temp 98.9 °F (37.2 °C) (Temporal) prompt immediate return. The patient and/or family expressed clear understanding of these instructions and agrees to the following plan provided.   The patient and/or family was also given written discharge instructions including information regarding toda

## 2021-03-20 NOTE — ED NOTES
Spoke with patient. Pt reports feeling much better today. Advised patient to complete treatment and f/u with pcp if needed. Patient verbalized understanding of information given.

## 2021-04-01 DIAGNOSIS — Z23 NEED FOR VACCINATION: ICD-10-CM

## 2022-01-11 ENCOUNTER — PATIENT MESSAGE (OUTPATIENT)
Dept: FAMILY MEDICINE CLINIC | Facility: CLINIC | Age: 62
End: 2022-01-11

## 2022-01-11 PROBLEM — U07.1 COVID-19 VIRUS INFECTION: Status: ACTIVE | Noted: 2022-01-11

## 2022-01-11 RX ORDER — ALBUTEROL SULFATE 90 UG/1
2 AEROSOL, METERED RESPIRATORY (INHALATION) EVERY 4 HOURS PRN
Qty: 1 EACH | Refills: 0 | Status: SHIPPED | OUTPATIENT
Start: 2022-01-11

## 2022-01-11 NOTE — TELEPHONE ENCOUNTER
From: Otilio Stoll  To: Emilie Peters DO  Sent: 1/11/2022 3:25 PM CST  Subject: Covid positive test today 1/11/22    I just tested positive for Covid. I have cold symptoms. I checked my peak flow and I'm at 350. .. I'm usually between 400 and 450.  I

## 2022-04-13 ENCOUNTER — OFFICE VISIT (OUTPATIENT)
Dept: FAMILY MEDICINE CLINIC | Facility: CLINIC | Age: 62
End: 2022-04-13
Payer: COMMERCIAL

## 2022-04-13 ENCOUNTER — LABORATORY ENCOUNTER (OUTPATIENT)
Dept: LAB | Age: 62
End: 2022-04-13
Attending: FAMILY MEDICINE
Payer: COMMERCIAL

## 2022-04-13 VITALS
OXYGEN SATURATION: 99 % | HEIGHT: 64 IN | DIASTOLIC BLOOD PRESSURE: 70 MMHG | WEIGHT: 171 LBS | HEART RATE: 75 BPM | SYSTOLIC BLOOD PRESSURE: 114 MMHG | BODY MASS INDEX: 29.19 KG/M2 | TEMPERATURE: 99 F | RESPIRATION RATE: 20 BRPM

## 2022-04-13 DIAGNOSIS — Z01.419 WELL WOMAN EXAM WITH ROUTINE GYNECOLOGICAL EXAM: Primary | ICD-10-CM

## 2022-04-13 DIAGNOSIS — K21.9 GASTROESOPHAGEAL REFLUX DISEASE WITHOUT ESOPHAGITIS: ICD-10-CM

## 2022-04-13 DIAGNOSIS — Z01.419 WELL WOMAN EXAM WITH ROUTINE GYNECOLOGICAL EXAM: ICD-10-CM

## 2022-04-13 DIAGNOSIS — N95.2 POSTMENOPAUSAL ATROPHIC VAGINITIS: ICD-10-CM

## 2022-04-13 DIAGNOSIS — J30.89 SEASONAL ALLERGIC RHINITIS DUE TO OTHER ALLERGIC TRIGGER: ICD-10-CM

## 2022-04-13 DIAGNOSIS — R73.03 PRE-DIABETES: ICD-10-CM

## 2022-04-13 DIAGNOSIS — M17.11 PRIMARY OSTEOARTHRITIS OF RIGHT KNEE: ICD-10-CM

## 2022-04-13 DIAGNOSIS — D12.3 BENIGN NEOPLASM OF TRANSVERSE COLON: ICD-10-CM

## 2022-04-13 DIAGNOSIS — D12.5 BENIGN NEOPLASM OF SIGMOID COLON: ICD-10-CM

## 2022-04-13 DIAGNOSIS — Z23 NEED FOR VACCINATION: ICD-10-CM

## 2022-04-13 DIAGNOSIS — I87.2 VENOUS INSUFFICIENCY OF BOTH LOWER EXTREMITIES: ICD-10-CM

## 2022-04-13 DIAGNOSIS — K63.5 POLYP OF COLON, UNSPECIFIED PART OF COLON, UNSPECIFIED TYPE: ICD-10-CM

## 2022-04-13 DIAGNOSIS — L71.9 ROSACEA: ICD-10-CM

## 2022-04-13 LAB
ALBUMIN SERPL-MCNC: 3.6 G/DL (ref 3.4–5)
ALBUMIN/GLOB SERPL: 1 {RATIO} (ref 1–2)
ALP LIVER SERPL-CCNC: 81 U/L
ALT SERPL-CCNC: 57 U/L
ANION GAP SERPL CALC-SCNC: 5 MMOL/L (ref 0–18)
AST SERPL-CCNC: 40 U/L (ref 15–37)
BASOPHILS # BLD AUTO: 0.06 X10(3) UL (ref 0–0.2)
BASOPHILS NFR BLD AUTO: 1 %
BILIRUB SERPL-MCNC: 0.6 MG/DL (ref 0.1–2)
BUN BLD-MCNC: 15 MG/DL (ref 7–18)
CALCIUM BLD-MCNC: 9.1 MG/DL (ref 8.5–10.1)
CHLORIDE SERPL-SCNC: 105 MMOL/L (ref 98–112)
CHOLEST SERPL-MCNC: 212 MG/DL (ref ?–200)
CO2 SERPL-SCNC: 29 MMOL/L (ref 21–32)
CREAT BLD-MCNC: 0.76 MG/DL
EOSINOPHIL # BLD AUTO: 0.08 X10(3) UL (ref 0–0.7)
EOSINOPHIL NFR BLD AUTO: 1.4 %
ERYTHROCYTE [DISTWIDTH] IN BLOOD BY AUTOMATED COUNT: 15.3 %
EST. AVERAGE GLUCOSE BLD GHB EST-MCNC: 108 MG/DL (ref 68–126)
FASTING PATIENT LIPID ANSWER: YES
FASTING STATUS PATIENT QL REPORTED: YES
GLOBULIN PLAS-MCNC: 3.7 G/DL (ref 2.8–4.4)
GLUCOSE BLD-MCNC: 90 MG/DL (ref 70–99)
HBA1C MFR BLD: 5.4 % (ref ?–5.7)
HCT VFR BLD AUTO: 40.1 %
HDLC SERPL-MCNC: 50 MG/DL (ref 40–59)
HGB BLD-MCNC: 12.5 G/DL
IMM GRANULOCYTES # BLD AUTO: 0.02 X10(3) UL (ref 0–1)
IMM GRANULOCYTES NFR BLD: 0.3 %
LDLC SERPL CALC-MCNC: 144 MG/DL (ref ?–100)
LYMPHOCYTES # BLD AUTO: 3.18 X10(3) UL (ref 1–4)
LYMPHOCYTES NFR BLD AUTO: 55.1 %
MCH RBC QN AUTO: 29.7 PG (ref 26–34)
MCHC RBC AUTO-ENTMCNC: 31.2 G/DL (ref 31–37)
MCV RBC AUTO: 95.2 FL
MONOCYTES # BLD AUTO: 0.65 X10(3) UL (ref 0.1–1)
MONOCYTES NFR BLD AUTO: 11.3 %
NEUTROPHILS # BLD AUTO: 1.78 X10 (3) UL (ref 1.5–7.7)
NEUTROPHILS # BLD AUTO: 1.78 X10(3) UL (ref 1.5–7.7)
NEUTROPHILS NFR BLD AUTO: 30.9 %
NONHDLC SERPL-MCNC: 162 MG/DL (ref ?–130)
OSMOLALITY SERPL CALC.SUM OF ELEC: 288 MOSM/KG (ref 275–295)
PLATELET # BLD AUTO: 265 10(3)UL (ref 150–450)
POTASSIUM SERPL-SCNC: 4.2 MMOL/L (ref 3.5–5.1)
PROT SERPL-MCNC: 7.3 G/DL (ref 6.4–8.2)
RBC # BLD AUTO: 4.21 X10(6)UL
SODIUM SERPL-SCNC: 139 MMOL/L (ref 136–145)
TRIGL SERPL-MCNC: 99 MG/DL (ref 30–149)
TSI SER-ACNC: 1.95 MIU/ML (ref 0.36–3.74)
VLDLC SERPL CALC-MCNC: 18 MG/DL (ref 0–30)
WBC # BLD AUTO: 5.8 X10(3) UL (ref 4–11)

## 2022-04-13 PROCEDURE — 85025 COMPLETE CBC W/AUTO DIFF WBC: CPT

## 2022-04-13 PROCEDURE — 99396 PREV VISIT EST AGE 40-64: CPT | Performed by: FAMILY MEDICINE

## 2022-04-13 PROCEDURE — 80061 LIPID PANEL: CPT

## 2022-04-13 PROCEDURE — 3078F DIAST BP <80 MM HG: CPT | Performed by: FAMILY MEDICINE

## 2022-04-13 PROCEDURE — 3008F BODY MASS INDEX DOCD: CPT | Performed by: FAMILY MEDICINE

## 2022-04-13 PROCEDURE — 80053 COMPREHEN METABOLIC PANEL: CPT

## 2022-04-13 PROCEDURE — 83036 HEMOGLOBIN GLYCOSYLATED A1C: CPT

## 2022-04-13 PROCEDURE — 36415 COLL VENOUS BLD VENIPUNCTURE: CPT

## 2022-04-13 PROCEDURE — 84443 ASSAY THYROID STIM HORMONE: CPT

## 2022-04-13 PROCEDURE — 3074F SYST BP LT 130 MM HG: CPT | Performed by: FAMILY MEDICINE

## 2022-05-06 ENCOUNTER — TELEPHONE (OUTPATIENT)
Dept: FAMILY MEDICINE CLINIC | Facility: CLINIC | Age: 62
End: 2022-05-06

## 2022-05-06 NOTE — TELEPHONE ENCOUNTER
Good news! Your diagnostic studies of the right breast shows a stable round mass on mammogram and nothing on the ultrasound. This is probably benign.  Repeat  R diagnostic mammogram and US recommended in 6 months for stability schedule this for november

## 2022-06-20 PROBLEM — Z86.010 HISTORY OF ADENOMATOUS POLYP OF COLON: Status: ACTIVE | Noted: 2022-06-20

## 2022-06-20 PROBLEM — Z80.0 FAMILY HISTORY OF COLON CANCER: Status: ACTIVE | Noted: 2022-06-20

## 2022-06-20 PROBLEM — Z86.0101 HISTORY OF ADENOMATOUS POLYP OF COLON: Status: ACTIVE | Noted: 2022-06-20

## 2022-12-21 ENCOUNTER — OFFICE VISIT (OUTPATIENT)
Dept: FAMILY MEDICINE CLINIC | Facility: CLINIC | Age: 62
End: 2022-12-21
Payer: COMMERCIAL

## 2022-12-21 VITALS
BODY MASS INDEX: 30 KG/M2 | SYSTOLIC BLOOD PRESSURE: 120 MMHG | DIASTOLIC BLOOD PRESSURE: 64 MMHG | TEMPERATURE: 98 F | HEART RATE: 68 BPM | WEIGHT: 175.5 LBS | OXYGEN SATURATION: 97 %

## 2022-12-21 DIAGNOSIS — H83.03 ACUTE LABYRINTHITIS, BILATERAL: Primary | ICD-10-CM

## 2022-12-21 PROCEDURE — 3078F DIAST BP <80 MM HG: CPT | Performed by: FAMILY MEDICINE

## 2022-12-21 PROCEDURE — 99213 OFFICE O/P EST LOW 20 MIN: CPT | Performed by: FAMILY MEDICINE

## 2022-12-21 PROCEDURE — 3074F SYST BP LT 130 MM HG: CPT | Performed by: FAMILY MEDICINE

## 2022-12-21 RX ORDER — MECLIZINE HCL 12.5 MG/1
25 TABLET ORAL 3 TIMES DAILY PRN
Qty: 30 TABLET | Refills: 1 | Status: SHIPPED | OUTPATIENT
Start: 2022-12-21

## 2022-12-21 RX ORDER — PREDNISONE 20 MG/1
20 TABLET ORAL DAILY
Qty: 5 TABLET | Refills: 0 | Status: SHIPPED | OUTPATIENT
Start: 2022-12-21 | End: 2022-12-26

## 2023-02-27 ENCOUNTER — PATIENT OUTREACH (OUTPATIENT)
Dept: FAMILY MEDICINE CLINIC | Facility: CLINIC | Age: 63
End: 2023-02-27

## 2023-05-27 ENCOUNTER — PATIENT MESSAGE (OUTPATIENT)
Dept: FAMILY MEDICINE CLINIC | Facility: CLINIC | Age: 63
End: 2023-05-27

## 2023-05-27 NOTE — TELEPHONE ENCOUNTER
I actually don't see an order for a mammogram for her. I see she is seeing Dr. Linda Girard for her physical on 6/2. It has been more then a year since her last mammogram and the last one was abnormal with a possibly benign spot seen so due to this it could be considered diagnostic.

## 2023-05-27 NOTE — TELEPHONE ENCOUNTER
From: Alma Ovalle  To: Siri Rodriguez DO  Sent: 5/27/2023 7:06 AM CDT  Subject: Mammogram    Am I suppose to schedule my yearly mammogram? I scheduled one for next Tuesday but I only see a diagnostic order on file. Are my mammograms now considered diagnistic exams?

## 2023-06-02 ENCOUNTER — PATIENT MESSAGE (OUTPATIENT)
Dept: FAMILY MEDICINE CLINIC | Facility: CLINIC | Age: 63
End: 2023-06-02

## 2023-06-02 ENCOUNTER — LAB ENCOUNTER (OUTPATIENT)
Dept: LAB | Age: 63
End: 2023-06-02
Attending: FAMILY MEDICINE
Payer: COMMERCIAL

## 2023-06-02 ENCOUNTER — OFFICE VISIT (OUTPATIENT)
Dept: FAMILY MEDICINE CLINIC | Facility: CLINIC | Age: 63
End: 2023-06-02
Payer: COMMERCIAL

## 2023-06-02 VITALS
RESPIRATION RATE: 16 BRPM | TEMPERATURE: 98 F | SYSTOLIC BLOOD PRESSURE: 110 MMHG | WEIGHT: 177 LBS | OXYGEN SATURATION: 98 % | HEIGHT: 64.75 IN | HEART RATE: 77 BPM | DIASTOLIC BLOOD PRESSURE: 72 MMHG | BODY MASS INDEX: 29.85 KG/M2

## 2023-06-02 DIAGNOSIS — I87.2 VENOUS INSUFFICIENCY OF BOTH LOWER EXTREMITIES: ICD-10-CM

## 2023-06-02 DIAGNOSIS — Z83.71 FAMILY HISTORY OF COLONIC POLYPS: ICD-10-CM

## 2023-06-02 DIAGNOSIS — Z86.010 HISTORY OF ADENOMATOUS POLYP OF COLON: ICD-10-CM

## 2023-06-02 DIAGNOSIS — J30.89 SEASONAL ALLERGIC RHINITIS DUE TO OTHER ALLERGIC TRIGGER: ICD-10-CM

## 2023-06-02 DIAGNOSIS — Z01.419 WELL WOMAN EXAM WITH ROUTINE GYNECOLOGICAL EXAM: Primary | ICD-10-CM

## 2023-06-02 DIAGNOSIS — Z01.419 WELL WOMAN EXAM WITH ROUTINE GYNECOLOGICAL EXAM: ICD-10-CM

## 2023-06-02 DIAGNOSIS — K21.9 GASTROESOPHAGEAL REFLUX DISEASE WITHOUT ESOPHAGITIS: ICD-10-CM

## 2023-06-02 DIAGNOSIS — M17.11 PRIMARY OSTEOARTHRITIS OF RIGHT KNEE: ICD-10-CM

## 2023-06-02 DIAGNOSIS — N95.2 POSTMENOPAUSAL ATROPHIC VAGINITIS: ICD-10-CM

## 2023-06-02 DIAGNOSIS — Z23 NEED FOR VACCINATION: ICD-10-CM

## 2023-06-02 DIAGNOSIS — L71.9 ROSACEA: ICD-10-CM

## 2023-06-02 LAB
ALBUMIN SERPL-MCNC: 3.7 G/DL (ref 3.4–5)
ALBUMIN/GLOB SERPL: 1 {RATIO} (ref 1–2)
ALP LIVER SERPL-CCNC: 58 U/L
ALT SERPL-CCNC: 27 U/L
ANION GAP SERPL CALC-SCNC: 3 MMOL/L (ref 0–18)
AST SERPL-CCNC: 16 U/L (ref 15–37)
BASOPHILS # BLD AUTO: 0.03 X10(3) UL (ref 0–0.2)
BASOPHILS NFR BLD AUTO: 0.7 %
BILIRUB SERPL-MCNC: 0.6 MG/DL (ref 0.1–2)
BUN BLD-MCNC: 13 MG/DL (ref 7–18)
CALCIUM BLD-MCNC: 9.1 MG/DL (ref 8.5–10.1)
CHLORIDE SERPL-SCNC: 107 MMOL/L (ref 98–112)
CHOLEST SERPL-MCNC: 188 MG/DL (ref ?–200)
CO2 SERPL-SCNC: 27 MMOL/L (ref 21–32)
CREAT BLD-MCNC: 0.85 MG/DL
EOSINOPHIL # BLD AUTO: 0.08 X10(3) UL (ref 0–0.7)
EOSINOPHIL NFR BLD AUTO: 1.8 %
ERYTHROCYTE [DISTWIDTH] IN BLOOD BY AUTOMATED COUNT: 13 %
EST. AVERAGE GLUCOSE BLD GHB EST-MCNC: 117 MG/DL (ref 68–126)
FASTING PATIENT LIPID ANSWER: YES
FASTING STATUS PATIENT QL REPORTED: YES
GFR SERPLBLD BASED ON 1.73 SQ M-ARVRAT: 76 ML/MIN/1.73M2 (ref 60–?)
GLOBULIN PLAS-MCNC: 3.7 G/DL (ref 2.8–4.4)
GLUCOSE BLD-MCNC: 86 MG/DL (ref 70–99)
HBA1C MFR BLD: 5.7 % (ref ?–5.7)
HCT VFR BLD AUTO: 40.8 %
HDLC SERPL-MCNC: 63 MG/DL (ref 40–59)
HGB BLD-MCNC: 13.3 G/DL
IMM GRANULOCYTES # BLD AUTO: 0.01 X10(3) UL (ref 0–1)
IMM GRANULOCYTES NFR BLD: 0.2 %
LDLC SERPL CALC-MCNC: 112 MG/DL (ref ?–100)
LYMPHOCYTES # BLD AUTO: 1.7 X10(3) UL (ref 1–4)
LYMPHOCYTES NFR BLD AUTO: 37.4 %
MCH RBC QN AUTO: 31 PG (ref 26–34)
MCHC RBC AUTO-ENTMCNC: 32.6 G/DL (ref 31–37)
MCV RBC AUTO: 95.1 FL
MONOCYTES # BLD AUTO: 0.37 X10(3) UL (ref 0.1–1)
MONOCYTES NFR BLD AUTO: 8.1 %
NEUTROPHILS # BLD AUTO: 2.35 X10 (3) UL (ref 1.5–7.7)
NEUTROPHILS # BLD AUTO: 2.35 X10(3) UL (ref 1.5–7.7)
NEUTROPHILS NFR BLD AUTO: 51.8 %
NONHDLC SERPL-MCNC: 125 MG/DL (ref ?–130)
OSMOLALITY SERPL CALC.SUM OF ELEC: 283 MOSM/KG (ref 275–295)
PLATELET # BLD AUTO: 298 10(3)UL (ref 150–450)
POTASSIUM SERPL-SCNC: 3.9 MMOL/L (ref 3.5–5.1)
PROT SERPL-MCNC: 7.4 G/DL (ref 6.4–8.2)
RBC # BLD AUTO: 4.29 X10(6)UL
SODIUM SERPL-SCNC: 137 MMOL/L (ref 136–145)
TRIGL SERPL-MCNC: 68 MG/DL (ref 30–149)
TSI SER-ACNC: 2.04 MIU/ML (ref 0.36–3.74)
VLDLC SERPL CALC-MCNC: 12 MG/DL (ref 0–30)
WBC # BLD AUTO: 4.5 X10(3) UL (ref 4–11)

## 2023-06-02 PROCEDURE — 80061 LIPID PANEL: CPT

## 2023-06-02 PROCEDURE — 84443 ASSAY THYROID STIM HORMONE: CPT

## 2023-06-02 PROCEDURE — 87624 HPV HI-RISK TYP POOLED RSLT: CPT | Performed by: FAMILY MEDICINE

## 2023-06-02 PROCEDURE — 83036 HEMOGLOBIN GLYCOSYLATED A1C: CPT

## 2023-06-02 PROCEDURE — 80053 COMPREHEN METABOLIC PANEL: CPT

## 2023-06-02 PROCEDURE — 36415 COLL VENOUS BLD VENIPUNCTURE: CPT

## 2023-06-02 PROCEDURE — 3074F SYST BP LT 130 MM HG: CPT | Performed by: FAMILY MEDICINE

## 2023-06-02 PROCEDURE — 85025 COMPLETE CBC W/AUTO DIFF WBC: CPT

## 2023-06-02 PROCEDURE — 3078F DIAST BP <80 MM HG: CPT | Performed by: FAMILY MEDICINE

## 2023-06-02 PROCEDURE — 3008F BODY MASS INDEX DOCD: CPT | Performed by: FAMILY MEDICINE

## 2023-06-02 PROCEDURE — 99396 PREV VISIT EST AGE 40-64: CPT | Performed by: FAMILY MEDICINE

## 2023-06-05 DIAGNOSIS — R73.03 PRE-DIABETES: Primary | ICD-10-CM

## 2023-06-05 DIAGNOSIS — Z01.419 WELL WOMAN EXAM WITH ROUTINE GYNECOLOGICAL EXAM: ICD-10-CM

## 2023-06-05 LAB — HPV I/H RISK 1 DNA SPEC QL NAA+PROBE: NEGATIVE

## 2023-06-30 ENCOUNTER — TELEPHONE (OUTPATIENT)
Dept: FAMILY MEDICINE CLINIC | Facility: CLINIC | Age: 63
End: 2023-06-30

## 2023-06-30 ENCOUNTER — MED REC SCAN ONLY (OUTPATIENT)
Dept: FAMILY MEDICINE CLINIC | Facility: CLINIC | Age: 63
End: 2023-06-30

## 2023-06-30 DIAGNOSIS — R92.8 ABNORMAL MAMMOGRAM OF RIGHT BREAST: Primary | ICD-10-CM

## 2023-06-30 NOTE — TELEPHONE ENCOUNTER
Reather No your mammogram shows a stable 3 mm nodule in the right breast. Radiology recommends follow up 6 month diagnostic mammogram to assess stability. Order placed . Schedule 6 month follow up. Please fax order to [facility] for this procedure.

## 2023-07-05 ENCOUNTER — PATIENT MESSAGE (OUTPATIENT)
Dept: FAMILY MEDICINE CLINIC | Facility: CLINIC | Age: 63
End: 2023-07-05

## 2023-07-05 NOTE — TELEPHONE ENCOUNTER
From: Clint Caraballo  To: Anibal [address]., DO  Sent: 7/5/2023 3:11 PM CDT  Subject: Mammogram results    I had a mammogram at [facility] on June 26th. I requested the results be sent to you. I hope you get them soon.

## 2023-07-05 NOTE — TELEPHONE ENCOUNTER
This was addressed on 6/30/2023 at 8:03.   Needs follow up diagnostic mammogram of right breast in 6 months

## 2023-07-06 NOTE — TELEPHONE ENCOUNTER
Spoke with patient reviewed provider comments from 6-30-23 encounter regarding mammogram results with patient. Verbalized understanding and agrees with plan.

## 2023-07-06 NOTE — TELEPHONE ENCOUNTER
Left message for patient to call back on designated voicemail to review results. There was a My Chart message sent on 6-30-23.  Order for follow up imaging faxed to [facility] [place].

## 2023-07-09 ENCOUNTER — APPOINTMENT (OUTPATIENT)
Dept: GENERAL RADIOLOGY | Age: 63
End: 2023-07-09
Attending: NURSE PRACTITIONER
Payer: COMMERCIAL

## 2023-07-09 ENCOUNTER — HOSPITAL ENCOUNTER (OUTPATIENT)
Age: 63
Discharge: HOME | End: 2023-07-09
Payer: COMMERCIAL

## 2023-07-09 VITALS
BODY MASS INDEX: 28.32 KG/M2 | SYSTOLIC BLOOD PRESSURE: 114 MMHG | HEIGHT: 65 IN | WEIGHT: 170 LBS | TEMPERATURE: 98 F | OXYGEN SATURATION: 94 % | RESPIRATION RATE: 18 BRPM | HEART RATE: 67 BPM | DIASTOLIC BLOOD PRESSURE: 75 MMHG

## 2023-07-09 DIAGNOSIS — S93.402A MODERATE LEFT ANKLE SPRAIN, INITIAL ENCOUNTER: Primary | ICD-10-CM

## 2023-07-09 PROCEDURE — 73610 X-RAY EXAM OF ANKLE: CPT | Performed by: NURSE PRACTITIONER

## 2023-07-09 PROCEDURE — 99213 OFFICE O/P EST LOW 20 MIN: CPT | Performed by: NURSE PRACTITIONER

## 2023-07-09 PROCEDURE — L4350 ANKLE CONTROL ORTHO PRE OTS: HCPCS | Performed by: NURSE PRACTITIONER

## 2023-07-09 NOTE — DISCHARGE INSTRUCTIONS
Continue to rest and elevate your ankle as much as possible. Wear the splint as instructed to help with compression and support. Take Tylenol and/or ibuprofen as needed for pain control. Follow up with Dr. Yesica Quiroz for orthopedics as needed. Thank you for choosing [facility] for your care.

## 2023-09-19 ENCOUNTER — OFFICE VISIT (OUTPATIENT)
Dept: FAMILY MEDICINE CLINIC | Facility: CLINIC | Age: 63
End: 2023-09-19
Payer: COMMERCIAL

## 2023-09-19 VITALS
BODY MASS INDEX: 29.66 KG/M2 | RESPIRATION RATE: 18 BRPM | SYSTOLIC BLOOD PRESSURE: 110 MMHG | OXYGEN SATURATION: 97 % | HEART RATE: 80 BPM | HEIGHT: 65 IN | DIASTOLIC BLOOD PRESSURE: 78 MMHG | TEMPERATURE: 98 F | WEIGHT: 178 LBS

## 2023-09-19 DIAGNOSIS — J30.89 SEASONAL ALLERGIC RHINITIS DUE TO OTHER ALLERGIC TRIGGER: ICD-10-CM

## 2023-09-19 DIAGNOSIS — R05.1 ACUTE COUGH: Primary | ICD-10-CM

## 2023-09-19 DIAGNOSIS — J40 BRONCHITIS: ICD-10-CM

## 2023-09-19 LAB
OPERATOR ID: NORMAL
RAPID SARS-COV-2 BY PCR: NOT DETECTED

## 2023-09-19 PROCEDURE — U0002 COVID-19 LAB TEST NON-CDC: HCPCS | Performed by: PHYSICIAN ASSISTANT

## 2023-09-19 PROCEDURE — 99213 OFFICE O/P EST LOW 20 MIN: CPT | Performed by: PHYSICIAN ASSISTANT

## 2023-09-19 PROCEDURE — 3074F SYST BP LT 130 MM HG: CPT | Performed by: PHYSICIAN ASSISTANT

## 2023-09-19 PROCEDURE — 3078F DIAST BP <80 MM HG: CPT | Performed by: PHYSICIAN ASSISTANT

## 2023-09-19 PROCEDURE — 3008F BODY MASS INDEX DOCD: CPT | Performed by: PHYSICIAN ASSISTANT

## 2023-09-19 RX ORDER — ALBUTEROL SULFATE 90 UG/1
2 AEROSOL, METERED RESPIRATORY (INHALATION) EVERY 4 HOURS PRN
Qty: 1 EACH | Refills: 0 | Status: SHIPPED | OUTPATIENT
Start: 2023-09-19

## 2023-09-19 RX ORDER — PREDNISONE 20 MG/1
TABLET ORAL
Qty: 10 TABLET | Refills: 0 | Status: SHIPPED | OUTPATIENT
Start: 2023-09-19

## 2023-12-22 ENCOUNTER — TELEPHONE (OUTPATIENT)
Dept: FAMILY MEDICINE CLINIC | Facility: CLINIC | Age: 63
End: 2023-12-22

## 2023-12-22 DIAGNOSIS — R92.8 ABNORMAL MAMMOGRAM OF RIGHT BREAST: Primary | ICD-10-CM

## 2023-12-22 NOTE — TELEPHONE ENCOUNTER
Sydni Najera your Mammogram  shows a stable asymmetry in upper outer right breastRepeat in 6 months Continue monthly self breast exam. Follow up with any palpable or visible abnormality.

## 2023-12-22 NOTE — TELEPHONE ENCOUNTER
Spoke with patient   Given Dr Pedro Bejarano recommendations re: mammogram  Your Mammogram  shows a stable asymmetry in upper outer right breastRepeat in 6 months Continue monthly self breast exam. Follow up with any palpable or visible abnormality.      Pt verbalized understanding  Order placed for 6 month - faxed to [facility]

## 2024-02-07 ENCOUNTER — PATIENT MESSAGE (OUTPATIENT)
Dept: FAMILY MEDICINE CLINIC | Facility: CLINIC | Age: 64
End: 2024-02-07

## 2024-02-07 DIAGNOSIS — R92.8 ABNORMAL MAMMOGRAM OF RIGHT BREAST: Primary | ICD-10-CM

## 2024-02-08 NOTE — TELEPHONE ENCOUNTER
From: Abby Mann  To: NAOMIE Zimmerman  Sent: 2/7/2024 1:28 PM CST  Subject: Right breast mammogram    I have scheduled my next mammogram for June 5th at [facility]. Effective May 1st, 2024, I will be enrolled in Medicare and will have BCBS PPO was my supplemental coverage.     Abby Mann

## 2024-02-20 NOTE — PATIENT INSTRUCTIONS
Group Topic: BH Therapeutic Activity    Date: 2/20/2024  Start Time: 1300  End Time: 1330  Facilitators: Ronda Latif CTRS    Focus: Recreation and Leisure awareness Activity  Number in attendance: 6  To provide a recreation activity; to promote a discussion and socialization among the patients and an awareness of healthy recreation and leisure choices as facilitated by the therapist.    Method: Group  Attendance: Present  Participation: Active  Patient Response: Appropriate feedback, Good eye contact, and Tangential  Mood: Anxious and Depressed  Affect: Type: Anxious and Depressed   Range: Blunted/flat   Congruency: Incongruent   Stability: Stable  Behavior/Socialization: Appropriate to group, Cooperative, and Outspoken  Thought Process: Goal-directed and Poverty of thoughts  Task Performance: Follows directions, Needs clarification, and Needs cueing  Patient Evaluation: Encouragement - needs prompts     Prevention Guidelines, Women Ages 48 to 59  Screening tests and vaccines are an important part of managing your health. A screening test is done to find possible disorders or diseases in people who don't have any symptoms.  The goal is to find a disease e Cervical cancer All women in this age group, except women who have had a complete hysterectomy Pap test every 3 years or Pap test with human papillomavirus (HPV) test every 5 years   Chlamydia Women who are sexually active and at increased risk for infecti , Eligibility criteria and age limit (possibly up to age [de-identified]) may vary across major organizations Yearly lung cancer screening with a low-dose CT scan (LDCT) Talk with your healthcare provider for more information.    Obesity All women in this age group At y PPSV23: 1 or 2 doses through age 64(protects against 23 types of pneumococcal bacteria)  Talk with your healthcare provider   Tetanus/diphtheria/pertussis (Td/Tdap) booster All women in this age group Td every 10 years, or a 1-time dose of Tdap instead of © 7846-9424 [address]. [address] [facility], [facility] [place]. All rights reserved. This information is not intended as a substitute for professional medical care. Always follow your healthcare professional's instructions.

## 2024-06-05 ENCOUNTER — TELEPHONE (OUTPATIENT)
Dept: FAMILY MEDICINE CLINIC | Facility: CLINIC | Age: 64
End: 2024-06-05

## 2024-06-05 NOTE — TELEPHONE ENCOUNTER
Called pt, left detailed message on approved verified voicemail. Dolls Killhart message also sent to pt.

## 2024-06-05 NOTE — TELEPHONE ENCOUNTER
Abby your Mammogram normal. Repeat in 1 year. Continue monthly self breast exam. Follow up with any palpable or visible abnormality.

## 2024-08-21 ENCOUNTER — OFFICE VISIT (OUTPATIENT)
Dept: FAMILY MEDICINE CLINIC | Facility: CLINIC | Age: 64
End: 2024-08-21
Payer: MEDICARE

## 2024-08-21 ENCOUNTER — LABORATORY ENCOUNTER (OUTPATIENT)
Dept: LAB | Age: 64
End: 2024-08-21
Attending: FAMILY MEDICINE
Payer: MEDICARE

## 2024-08-21 VITALS
TEMPERATURE: 99 F | HEART RATE: 78 BPM | OXYGEN SATURATION: 95 % | DIASTOLIC BLOOD PRESSURE: 72 MMHG | HEIGHT: 64.25 IN | BODY MASS INDEX: 30.58 KG/M2 | SYSTOLIC BLOOD PRESSURE: 110 MMHG | RESPIRATION RATE: 12 BRPM | WEIGHT: 179.13 LBS

## 2024-08-21 DIAGNOSIS — Z83.719 FAMILY HISTORY OF COLONIC POLYPS: ICD-10-CM

## 2024-08-21 DIAGNOSIS — K63.5 POLYP OF COLON, UNSPECIFIED PART OF COLON, UNSPECIFIED TYPE: ICD-10-CM

## 2024-08-21 DIAGNOSIS — G43.009 MIGRAINE WITHOUT AURA AND WITHOUT STATUS MIGRAINOSUS, NOT INTRACTABLE: ICD-10-CM

## 2024-08-21 DIAGNOSIS — J30.89 SEASONAL ALLERGIC RHINITIS DUE TO OTHER ALLERGIC TRIGGER: ICD-10-CM

## 2024-08-21 DIAGNOSIS — I87.2 VENOUS INSUFFICIENCY OF BOTH LOWER EXTREMITIES: ICD-10-CM

## 2024-08-21 DIAGNOSIS — Z00.00 ENCOUNTER FOR ANNUAL HEALTH EXAMINATION: ICD-10-CM

## 2024-08-21 DIAGNOSIS — R73.03 PRE-DIABETES: ICD-10-CM

## 2024-08-21 DIAGNOSIS — Z01.419 WELL WOMAN EXAM WITH ROUTINE GYNECOLOGICAL EXAM: Primary | ICD-10-CM

## 2024-08-21 DIAGNOSIS — M17.11 PRIMARY OSTEOARTHRITIS OF RIGHT KNEE: ICD-10-CM

## 2024-08-21 DIAGNOSIS — Z78.0 POSTMENOPAUSAL ESTROGEN DEFICIENCY: ICD-10-CM

## 2024-08-21 DIAGNOSIS — N95.2 POSTMENOPAUSAL ATROPHIC VAGINITIS: ICD-10-CM

## 2024-08-21 DIAGNOSIS — Z11.59 NEED FOR HEPATITIS C SCREENING TEST: ICD-10-CM

## 2024-08-21 DIAGNOSIS — K21.9 GASTROESOPHAGEAL REFLUX DISEASE WITHOUT ESOPHAGITIS: ICD-10-CM

## 2024-08-21 DIAGNOSIS — M77.31 CALCANEAL SPUR OF FOOT, RIGHT: ICD-10-CM

## 2024-08-21 DIAGNOSIS — L71.9 ROSACEA: ICD-10-CM

## 2024-08-21 LAB
ALBUMIN SERPL-MCNC: 4.7 G/DL (ref 3.2–4.8)
ALBUMIN/GLOB SERPL: 1.3 {RATIO} (ref 1–2)
ALP LIVER SERPL-CCNC: 76 U/L
ALT SERPL-CCNC: 11 U/L
ANION GAP SERPL CALC-SCNC: 4 MMOL/L (ref 0–18)
AST SERPL-CCNC: 16 U/L (ref ?–34)
BILIRUB SERPL-MCNC: 0.7 MG/DL (ref 0.2–1.1)
BUN BLD-MCNC: 16 MG/DL (ref 9–23)
CALCIUM BLD-MCNC: 10 MG/DL (ref 8.7–10.4)
CHLORIDE SERPL-SCNC: 106 MMOL/L (ref 98–112)
CO2 SERPL-SCNC: 29 MMOL/L (ref 21–32)
CREAT BLD-MCNC: 0.86 MG/DL
EGFRCR SERPLBLD CKD-EPI 2021: 75 ML/MIN/1.73M2 (ref 60–?)
EST. AVERAGE GLUCOSE BLD GHB EST-MCNC: 120 MG/DL (ref 68–126)
FASTING STATUS PATIENT QL REPORTED: YES
GLOBULIN PLAS-MCNC: 3.5 G/DL (ref 2–3.5)
GLUCOSE BLD-MCNC: 95 MG/DL (ref 70–99)
HBA1C MFR BLD: 5.8 % (ref ?–5.7)
HCV AB SERPL QL IA: NONREACTIVE
OSMOLALITY SERPL CALC.SUM OF ELEC: 289 MOSM/KG (ref 275–295)
POTASSIUM SERPL-SCNC: 4.1 MMOL/L (ref 3.5–5.1)
PROT SERPL-MCNC: 8.2 G/DL (ref 5.7–8.2)
SODIUM SERPL-SCNC: 139 MMOL/L (ref 136–145)

## 2024-08-21 PROCEDURE — 86803 HEPATITIS C AB TEST: CPT

## 2024-08-21 PROCEDURE — 36415 COLL VENOUS BLD VENIPUNCTURE: CPT

## 2024-08-21 PROCEDURE — 83036 HEMOGLOBIN GLYCOSYLATED A1C: CPT

## 2024-08-21 PROCEDURE — 80053 COMPREHEN METABOLIC PANEL: CPT

## 2024-08-21 RX ORDER — METRONIDAZOLE 10 MG/G
1 GEL TOPICAL 2 TIMES DAILY
Qty: 30 G | Refills: 3 | Status: SHIPPED | OUTPATIENT
Start: 2024-08-21 | End: 2024-12-19

## 2024-08-21 NOTE — PROGRESS NOTES
Subjective:   Abby Mann is a 64 year old female who presents for a IPPE (Initial Preventative Physical Exam) (Welcome to Medicare- < 12 months on Medicare) and scheduled follow up of multiple significant but stable problems.   Abby is doing well. Sleeps well . Retired. Under much stress due to nephew with autism and psych issues. Has migraine    History/Other:   Fall Risk Assessment:   She has been screened for Falls and is low risk.      Cognitive Assessment:   She had a completely normal cognitive assessment - see flowsheet entries       Functional Ability/Status:   Abby Mann has some abnormal functions as listed below:  She has Vision problems based on screening of functional status.       Depression Screening (PHQ):  PHQ-2 SCORE: 0  , done 8/14/2024             Advanced Directives:   She does have a Living Will but we do NOT have it on file in Epic.    She does NOT have a Power of  for Health Care. [Do you have a healthcare power of ?: No]  Discussed Advance Care Planning with patient (and family/surrogate if present). Standard forms made available to patient in After Visit Summary.      Patient Active Problem List   Diagnosis    Migraines    Allergic rhinitis    Menopause    Postmenopausal atrophic vaginitis    Venous insufficiency of leg    Rosacea, acne    Gastroesophageal reflux disease without esophagitis    Family history of colonic polyps    Polyp of colon    Other hemorrhoids    Benign neoplasm of transverse colon    Benign neoplasm of sigmoid colon    Benign neoplasm of rectum    Pre-diabetes    COVID-19 virus infection    History of adenomatous polyp of colon    Family history of colon cancer     Allergies:  She is allergic to casein; egg white [albumin, egg]; egg yolk; mold; other; and symbicort.    Current Medications:  Outpatient Medications Marked as Taking for the 8/21/24 encounter (Office Visit) with NAOMIE Zimmerman, DO   Medication Sig    metroNIDAZOLE 1 % External Gel  Apply 1 Application  topically 2 (two) times daily.    albuterol 108 (90 Base) MCG/ACT Inhalation Aero Soln Inhale 2 puffs into the lungs every 4 (four) hours as needed for Wheezing or Shortness of Breath.    Fexofenadine-Pseudoephedrine (ALLEGRA-D OR) Take by mouth.       Medical History:  She  has a past medical history of Acne rosacea (2012), Allergic rhinitis (2012), Arthritis (), Constipation, COVID-19 virus infection (2022), Food intolerance (2018), Headache disorder (), Hemorrhoids (), Migraines (2012), Mouth sores, Pain in joints (), Postmenopausal atrophic vaginitis (2012), Pre-diabetes, Reactive airway disease (HCC) (2012), Venous insufficiency of leg (2012), and Wears glasses ().  Surgical History:  She  has a past surgical history that includes ; remove tonsils/adenoids,11+ y/o; tonsillectomy; colonoscopy (09); colonoscopy (2019); and egd (2019).   Family History:  Her family history includes CAD in an other family member; Colon Cancer in her mother; Colon Polyps in her father and mother; Diabetes in her father; Germinoma in an other family member; Heart Attack in her paternal grandfather and another family member; Hypertension in her father and paternal grandmother; Migraines in her mother and sister; Other in her mother and sister; Thyroid Disorder in her mother; gerd/ Morales's esophagus in her father.  Social History:  She  reports that she has never smoked. She has never used smokeless tobacco. She reports current alcohol use of about 3.0 standard drinks of alcohol per week. She reports that she does not use drugs.    Tobacco:  She has never smoked tobacco.    CAGE Alcohol Screen:   CAGE screening score of 0 on 2024, showing low risk of alcohol abuse.      Patient Care Team:  NAOMIE Zimmerman DO as PCP - General (Family Medicine)  Cholo Flood (OTOLARYNGOLOGY)    Review of Systems  GENERAL: feels well  otherwise  SKIN: rosacea  EYES: denies blurred vision or double vision  HEENT: denies nasal congestion, sinus pain or ST  LUNGS: denies shortness of breath with exertion  CARDIOVASCULAR: denies chest pain on exertion  GI: denies abdominal pain, denies heartburn  : denies dysuria, vaginal discharge or itching, no complaint of urinary incontinence   MUSCULOSKELETAL: denies back pain  NEURO: denies headaches  PSYCHE: denies depression or anxiety  HEMATOLOGIC: denies hx of anemia  ENDOCRINE: denies thyroid history  ALL/ASTHMA: denies hx of allergy or asthma    Objective:   Physical Exam  Breasts:  normal appearance, no masses or tenderness, Inspection negative, No nipple retraction or dimpling, No nipple discharge or bleeding, No axillary or supraclavicular adenopathy, Normal to palpation without dominant masses    /72 (BP Location: Left arm, Patient Position: Sitting, Cuff Size: large)   Pulse 78   Temp 98.8 °F (37.1 °C) (Temporal)   Resp 12   Ht 5' 4.25\" (1.632 m)   Wt 179 lb 2 oz (81.3 kg)   SpO2 95%   BMI 30.51 kg/m²  Estimated body mass index is 30.51 kg/m² as calculated from the following:    Height as of this encounter: 5' 4.25\" (1.632 m).    Weight as of this encounter: 179 lb 2 oz (81.3 kg).    Medicare Hearing Assessment:   Hearing Screening    Time taken: 8/21/2024 10:17 AM  Entry User: Daniel Ureña MA  Screening Method: Whisper Test  Whisper Test Result: Pass       EXAM:   /72 (BP Location: Left arm, Patient Position: Sitting, Cuff Size: large)   Pulse 78   Temp 98.8 °F (37.1 °C) (Temporal)   Resp 12   Ht 5' 4.25\" (1.632 m)   Wt 179 lb 2 oz (81.3 kg)   SpO2 95%   BMI 30.51 kg/m²   GENERAL: well developed, well nourished,in no apparent distress  SKIN: rosacea T zone  HEENT: ears and throat are clear  NECK: supple,no adenopathy,no bruits  LUNGS: clear to auscultation  CARDIO: RRR without murmur  GI: good BS's,no masses, HSM or tenderness  EXTREMITIES: no cyanosis, clubbing or  edema, venous stasis      Assessment & Plan:   Abby Mann is a 64 year old female who presents for a Medicare Assessment.   1. Well woman exam with routine gynecological exam  - medicare wellness screening reviewed  - HCV today    2. Rosacea  - metrogel bid    3. Polyp of colon, unspecified part of colon, unspecified type  - colonoscopy current    4. Gastroesophageal reflux disease without esophagitis  - currently no meds  - Expanded, Low Complexity (88342)    5. Venous insufficiency of both lower extremities  - compression stockings  - Expanded, Low Complexity (92899)    6. Seasonal allergic rhinitis due to other allergic trigger  - allegra 18-0 mg  - Expanded, Low Complexity (29262)    7. Primary osteoarthritis of right knee  - advil prn  - Expanded, Low Complexity (17987)    8. Postmenopausal atrophic vaginitis  - XR DEXA BONE DENSITOMETRY (CPT=77080); Future    9. Family history of colonic polyps  - colonoscopy current    10. Pre-diabetes  - low glycemic diet  - Expanded, Low Complexity (78950)  - Comp Metabolic Panel (14) [E]; Future  - Hemoglobin A1C [E]; Future    11. Migraine without aura and without status migrainosus, not intractable  -- currently no meds  - Expanded, Low Complexity (10861)    12. Postmenopausal estrogen deficiency  - XR DEXA BONE DENSITOMETRY (CPT=77080); Future    13. Encounter for annual health examination  - wellness reviewed    14. Need for hepatitis C screening test  - HCV AB for  1945 thru  (Once in a lifetime); Future    The patient indicates understanding of these issues and agrees to the plan.  Continue with current treatment plan.  Imaging studies ordered.  Lab work ordered.  Reinforced healthy diet, lifestyle, and exercise.      Return in about 1 year (around 2025) for ROUTINE HEALTH MAINTENANCE.     NAOMIE Zimmerman DO, 2024     Supplementary Documentation:   General Health:  In the past six months, have you lost more than 10 pounds without trying?: 2 -  No  Has your appetite been poor?: No  Type of Diet: Balanced  How does the patient maintain a good energy level?: Daily Walks  How would you describe your daily physical activity?: Light  How would you describe your current health state?: Good  How do you maintain positive mental well-being?: Social Interaction;Puzzles;Games;Visiting Friends;Visiting Family  On a scale of 0 to 10, with 0 being no pain and 10 being severe pain, what is your pain level?: 1 - (Mild)  In the past six months, have you experienced urine leakage?: 0-No  At any time do you feel concerned for the safety/well-being of yourself and/or your children, in your home or elsewhere?: No  Have you had any immunizations at another office such as Influenza, Hepatitis B, Tetanus, or Pneumococcal?: No    Health Maintenance   Topic Date Due    Zoster Vaccines (2 of 3) 12/28/2012    COVID-19 Vaccine (2 - 2023-24 season) 09/01/2023    Annual Depression Screening  01/01/2024    DEXA Scan  05/22/2024    Annual Physical  06/02/2024    Influenza Vaccine (1) 10/01/2024    Mammogram  12/04/2024    Pneumococcal Vaccine: 65+ Years (3 of 3 - PPSV23 or PCV20) 01/15/2026    Pap Smear  06/02/2026    Colorectal Cancer Screening  06/20/2027    Fall Risk Screening (Annual)  Completed
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
## 2024-08-30 ENCOUNTER — HOSPITAL ENCOUNTER (OUTPATIENT)
Dept: BONE DENSITY | Age: 64
Discharge: HOME | End: 2024-08-30
Attending: FAMILY MEDICINE
Payer: MEDICARE

## 2024-08-30 DIAGNOSIS — N95.2 POSTMENOPAUSAL ATROPHIC VAGINITIS: ICD-10-CM

## 2024-08-30 DIAGNOSIS — Z78.0 POSTMENOPAUSAL ESTROGEN DEFICIENCY: ICD-10-CM

## 2024-08-30 PROCEDURE — 77080 DXA BONE DENSITY AXIAL: CPT | Performed by: FAMILY MEDICINE

## 2024-10-19 ENCOUNTER — APPOINTMENT (OUTPATIENT)
Dept: GENERAL RADIOLOGY | Age: 64
End: 2024-10-19
Attending: PHYSICIAN ASSISTANT
Payer: MEDICARE

## 2024-10-19 ENCOUNTER — HOSPITAL ENCOUNTER (OUTPATIENT)
Age: 64
Discharge: HOME | End: 2024-10-19
Payer: MEDICARE

## 2024-10-19 VITALS
TEMPERATURE: 98 F | BODY MASS INDEX: 30.39 KG/M2 | DIASTOLIC BLOOD PRESSURE: 83 MMHG | SYSTOLIC BLOOD PRESSURE: 126 MMHG | OXYGEN SATURATION: 98 % | RESPIRATION RATE: 20 BRPM | HEIGHT: 64 IN | WEIGHT: 178 LBS | HEART RATE: 73 BPM

## 2024-10-19 DIAGNOSIS — S82.891A CLOSED FRACTURE OF MALLEOLUS OF RIGHT ANKLE, INITIAL ENCOUNTER: ICD-10-CM

## 2024-10-19 DIAGNOSIS — S99.911A INJURY OF RIGHT ANKLE, INITIAL ENCOUNTER: Primary | ICD-10-CM

## 2024-10-19 PROCEDURE — 29515 APPLICATION SHORT LEG SPLINT: CPT | Performed by: PHYSICIAN ASSISTANT

## 2024-10-19 PROCEDURE — E0114 CRUTCH UNDERARM PAIR NO WOOD: HCPCS | Performed by: PHYSICIAN ASSISTANT

## 2024-10-19 PROCEDURE — 73610 X-RAY EXAM OF ANKLE: CPT | Performed by: PHYSICIAN ASSISTANT

## 2024-10-19 PROCEDURE — 99213 OFFICE O/P EST LOW 20 MIN: CPT | Performed by: PHYSICIAN ASSISTANT
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
## 2024-10-21 ENCOUNTER — TELEPHONE (OUTPATIENT)
Facility: CLINIC | Age: 64
End: 2024-10-21

## 2024-10-21 NOTE — TELEPHONE ENCOUNTER
Future Appointments   Date Time Provider Department Center   10/22/2024  9:30 AM Az Narayan, DO [facility]

## 2024-10-21 NOTE — TELEPHONE ENCOUNTER
Please advise if we can offer appointment? Thanks~    [facility]- notes in EMR    Xray~    FINDINGS:  Nondisplaced transverse fracture at the tip of the lateral malleolus is noted.  Medial malleolus is intact.  Ankle mortise and subtalar joint are intact.  There is enthesophyte formation dorsal aspect of the calcaneus.                   Impression:     CONCLUSION:  Nondisplaced transverse fracture distal fibula.

## 2024-10-22 ENCOUNTER — OFFICE VISIT (OUTPATIENT)
Dept: ORTHOPEDICS CLINIC | Facility: CLINIC | Age: 64
End: 2024-10-22
Payer: MEDICARE

## 2024-10-22 VITALS — BODY MASS INDEX: 30.39 KG/M2 | HEIGHT: 64 IN | WEIGHT: 178 LBS

## 2024-10-22 DIAGNOSIS — S93.491A SPRAIN OF ANTERIOR TALOFIBULAR LIGAMENT OF RIGHT ANKLE, INITIAL ENCOUNTER: ICD-10-CM

## 2024-10-22 DIAGNOSIS — S82.64XA CLOSED NONDISPLACED FRACTURE OF LATERAL MALLEOLUS OF RIGHT FIBULA, INITIAL ENCOUNTER: Primary | ICD-10-CM

## 2024-10-22 PROCEDURE — 99204 OFFICE O/P NEW MOD 45 MIN: CPT | Performed by: FAMILY MEDICINE

## 2024-10-22 RX ORDER — MELOXICAM 15 MG/1
15 TABLET ORAL DAILY
Qty: 30 TABLET | Refills: 0 | Status: SHIPPED | OUTPATIENT
Start: 2024-10-22 | End: 2024-10-22

## 2024-10-22 RX ORDER — MELOXICAM 15 MG/1
15 TABLET ORAL DAILY
Qty: 30 TABLET | Refills: 0 | Status: SHIPPED | OUTPATIENT
Start: 2024-10-22

## 2024-10-22 NOTE — H&P
Sports Medicine Clinic Note     Subjective:    Chief Complaint: Right ankle pain.    Date of Injury: 10/19/2024    History: The patient is a 64-year-old female who presents with right ankle pain and swelling following an injury. She reports that she rolled her ankle on uneven asphalt and fell onto her left side. There was immediate pain, bruising, and swelling localized to the right lateral ankle. She denies significant injury from the fall to other areas. She has a history of a previous fracture in the same ankle but no history of hardware placement. No numbness, tingling, or other neurological symptoms reported.    Objective    Right Ankle Examination:    Inspection: Visible swelling and bruising over the right lateral malleolus. No skin abrasions or open wounds.  Palpation: Tenderness localized to the right lateral malleolus. No tenderness over the medial malleolus or Achilles tendon.  Range of Motion: Limited plantarflexion and dorsiflexion secondary to pain. Active range of motion preserved but painful.  Neurovascular: Sensation intact to light touch in the foot. Capillary refill less than 2 seconds. Dorsalis pedis and posterior tibial pulses 2+ bilaterally.  Special Tests: Anterior drawer test and talar tilt test are positive for pain without instability.    Diagnostic Tests:    X-ray of the Right Ankle (3 Views): Nondisplaced transverse fracture at the tip of the lateral malleolus. No displacement of the medial malleolus. Ankle mortise and subtalar joint remain intact. Enthesophyte formation noted on the dorsal aspect of the calcaneus.    Assessment    Closed nondisplaced transverse fracture of the distal right fibula (lateral malleolus).  Right ankle sprain.    Plan    Therapy: Recommend physical therapy after the initial healing phase to restore strength and mobility.  Medications: Prescribe NSAIDs for pain and inflammation management. Recommend acetaminophen as needed for additional pain  control.  Bracing/Casting: Patient is placed in a CAM boot for immobilization. Use crutches to remain non-weight bearing on the affected ankle.  Activity Recommendations: Non-weight bearing status is advised for 4-6 weeks with gradual transition to partial weight bearing as pain allows. Elevation and icing are recommended to manage swelling. Advise avoiding high-impact activities and uneven surfaces.    Follow-Up: Tentatively scheduled in 3-4 week for repeat x-rays and to reassess healing and develop a rehabilitation plan.      Az Narayan DO, BETOM   Primary Care Sports Medicine    Department of Orthopaedic Surgery  [facility]    [address] [place] [id number]  [address] [place] [address]    t: [phone]  f: [phone]      [facility]

## 2024-11-08 ENCOUNTER — TELEPHONE (OUTPATIENT)
Facility: CLINIC | Age: 64
End: 2024-11-08

## 2024-11-08 DIAGNOSIS — S82.891D CLOSED FRACTURE OF RIGHT ANKLE WITH ROUTINE HEALING, SUBSEQUENT ENCOUNTER: Primary | ICD-10-CM

## 2024-11-08 NOTE — TELEPHONE ENCOUNTER
Xray ordered per Ortho protocol, Xray scheduled.  [employer]One message sent to patient to arrive 15 - 20 min early to complete imaging.

## 2024-11-12 ENCOUNTER — OFFICE VISIT (OUTPATIENT)
Dept: ORTHOPEDICS CLINIC | Facility: CLINIC | Age: 64
End: 2024-11-12
Payer: MEDICARE

## 2024-11-12 ENCOUNTER — HOSPITAL ENCOUNTER (OUTPATIENT)
Dept: GENERAL RADIOLOGY | Age: 64
Discharge: HOME | End: 2024-11-12
Attending: FAMILY MEDICINE
Payer: MEDICARE

## 2024-11-12 VITALS — WEIGHT: 178 LBS | HEIGHT: 64 IN | BODY MASS INDEX: 30.39 KG/M2

## 2024-11-12 DIAGNOSIS — S82.891D CLOSED FRACTURE OF RIGHT ANKLE WITH ROUTINE HEALING, SUBSEQUENT ENCOUNTER: ICD-10-CM

## 2024-11-12 DIAGNOSIS — S82.64XD CLOSED NONDISPLACED FRACTURE OF LATERAL MALLEOLUS OF RIGHT FIBULA WITH ROUTINE HEALING, SUBSEQUENT ENCOUNTER: ICD-10-CM

## 2024-11-12 DIAGNOSIS — S82.891D CLOSED FRACTURE OF RIGHT ANKLE WITH ROUTINE HEALING, SUBSEQUENT ENCOUNTER: Primary | ICD-10-CM

## 2024-11-12 DIAGNOSIS — S93.491D SPRAIN OF ANTERIOR TALOFIBULAR LIGAMENT OF RIGHT ANKLE, SUBSEQUENT ENCOUNTER: ICD-10-CM

## 2024-11-12 PROCEDURE — 99214 OFFICE O/P EST MOD 30 MIN: CPT | Performed by: FAMILY MEDICINE

## 2024-11-12 PROCEDURE — 73610 X-RAY EXAM OF ANKLE: CPT | Performed by: FAMILY MEDICINE

## 2024-11-12 NOTE — PROGRESS NOTES
Sports Medicine Clinic Note    Subjective:    Chief Complaint: Right ankle pain, improving.    Date of Injury: 10/19/2024    History: The patient is a 64-year-old female returning for follow-up on her right ankle injury. She reports significant improvement in pain and swelling since her last visit. She has been ambulating as tolerated in the CAM boot without pain. She denies any new symptoms such as numbness, tingling, or instability.    Objective:    Right Ankle Examination:    Inspection: Mild residual swelling over the right lateral malleolus; bruising has resolved. No skin discoloration, abrasions, or signs of infection.  Palpation: Mild tenderness over the lateral malleolus. No tenderness over the medial malleolus, anterior talofibular ligament, or Achilles tendon.  Range of Motion: Plantarflexion and dorsiflexion continue to be slightly limited but improved since prior visit; minimal discomfort at end range of motion.  Neurovascular: Sensation remains intact to light touch in all distributions in the foot. Capillary refill less than 2 seconds. Dorsalis pedis and posterior tibial pulses are 2+ bilaterally.  Special Tests: Anterior drawer and talar tilt tests are negative for pain or instability.    Diagnostic Tests:    Follow-up X-ray of the Right Ankle (3 Views): Nondisplaced transverse fracture at the tip of the lateral malleolus shows signs of early healing. No evidence of displacement. Ankle mortise and subtalar joint remain intact.    Assessment:    Improving closed nondisplaced transverse fracture of the distal right fibula, lateral malleolus.  Right ankle sprain, resolving.    Plan:    Therapy: Initiate physical therapy to gradually improve strength and flexibility as tolerated.  Medications: Continue NSAIDs for any residual pain or inflammation as needed.  Bracing/Casting: Continue CAM boot for weight-bearing as tolerated. Avoid transition out of the boot at this time. Patient has an ankle brace to use  at home once transition from CAM boot is appropriate.  Activity Recommendations: Weight-bearing as tolerated in CAM boot. Recommend avoiding uneven surfaces and high-impact activities. Gradual increase in weight-bearing as guided by comfort and physical therapy progress.    Follow-Up: Tentatively scheduled follow-up in 3-4 weeks with repeat x-rays to assess continued fracture healing.      Az Narayan DO, CAQSM   Primary Care Sports Medicine

## 2024-11-20 ENCOUNTER — OFFICE VISIT (OUTPATIENT)
Dept: PHYSICAL THERAPY | Age: 64
End: 2024-11-20
Attending: FAMILY MEDICINE
Payer: MEDICARE

## 2024-11-20 DIAGNOSIS — S93.491D SPRAIN OF ANTERIOR TALOFIBULAR LIGAMENT OF RIGHT ANKLE, SUBSEQUENT ENCOUNTER: ICD-10-CM

## 2024-11-20 DIAGNOSIS — S82.64XD CLOSED NONDISPLACED FRACTURE OF LATERAL MALLEOLUS OF RIGHT FIBULA WITH ROUTINE HEALING, SUBSEQUENT ENCOUNTER: ICD-10-CM

## 2024-11-20 DIAGNOSIS — S82.891D CLOSED FRACTURE OF RIGHT ANKLE WITH ROUTINE HEALING, SUBSEQUENT ENCOUNTER: Primary | ICD-10-CM

## 2024-11-20 PROCEDURE — 97140 MANUAL THERAPY 1/> REGIONS: CPT

## 2024-11-20 PROCEDURE — 97161 PT EVAL LOW COMPLEX 20 MIN: CPT

## 2024-11-20 PROCEDURE — 97110 THERAPEUTIC EXERCISES: CPT

## 2024-11-20 NOTE — PROGRESS NOTES
PHYSICAL THERAPY INITIAL EVALUATION     Date of service: 11/20/2024  Dx: Closed fracture of right ankle with routine healing, subsequent encounter (S82.891D), Closed nondisplaced fracture of lateral malleolus of right fibula with routine healing, subsequent encounter (S82.64XD), Sprain of anterior talofibular ligament of right ankle, subsequent encounter (S93.491D)       Insurance: MEDICARE PART A&B  Insurance Limits: N/A    Visit #: 1  Authorized # of Visits: 12 recommended  POC/Auth Expiration: N/A  Authorizing Physician/Provider: Helene     PATIENT SUMMARY     History/DELMA: Patient presents to physical therapy with a right ankle fracture. She reports tripping over asphalt but then contineud to walk on it afterwards. She reports that she iced it but eventually went to urgent care later that day. She had x-rays taken and was given a splint to use. She was already using a pair of crutches.     The patient followed-up with an orthopedic and was given a boot. She was told to start weaning from the boot at her most recent doctor's appt. She reports that she is trying to walk short distances around her home without the boot, which she is tolerating well.     She denies much pain, but notes sensations of weakness and instability at her ankle. She is ascending stairs with a reciprocal gait pattern but is descending stairs with a step-to gait pattern. She notes increased swelling with prolonged standing, which can make the boot uncomfortable to wear.     DOI/S: 10/19/24    Aggravating Factors: Moving the foot in certain directions, pushing down, walking long distances, walking on uneven terrain, navigating stairs     Alleviating Factors: Icing, elevation, OTC pain medication    PMH: The patient's PMH was reviewed with the patient including allergies, medications, and surgical and medical history. Patient  has a past medical history of Acne rosacea (11/2/2012), Allergic rhinitis (11/2/2012), Arthritis (2011), Constipation,  COVID-19 virus infection (01/11/2022), Food intolerance (October 2018), Headache disorder (1960), Hemorrhoids (1983), Migraines (11/2/2012), Mouth sores, Pain in joints (2011), Postmenopausal atrophic vaginitis (11/2/2012), Pre-diabetes, Reactive airway disease (HCC) (11/2/2012), Venous insufficiency of leg (11/2/2012), and Wears glasses (1966). PMH includes knee arthritis as well as previous ankle fracture on the same side. The patient reports a PMH of bone spurs in her calf and foot that she had been seeing a chiropractor for. She was previously doing exercises including stretches and lunges.     PLF/Personal Goals: \"Get back to moving around normally\" and hiking.     Occupation: Retired     OBJECTIVE:     Pain/Symptom Presentation: Patient reports some mild lateral ankle pain.    Pain at rest: 0/10  Pain at worst: 1-2/10    Outcomes Measure(s):   LEFS: 81.25% function    Activity Measures:  Standing/Walking After Prolonged Sitting: Mild Activity Limitation: Patient is with some mild stiffness that subsides within a few steps.   Standing: Mild Activity Limitation: Patient is able to stand up to 20 minutes before disruption due to foot/ankle pain.   Walking: Mild Activity Limitation: Patient is able to walk 0.5 miles before disruption due to foot/ankle pain.   Driving: Patient is not driving due to boot use.   Ascending Stairs: Mild Activity Limitation: Patient navigates up the stairs with step-over gait pattern, mild deviations.  Descending Stairs: Moderate Activity Limitation: Patient navigates down the stairs with step-to gait pattern, mild deviations.  Stairs: Moderate Activity Limitation: Patient is able to navigate 2-3 flights of stairs at a time.     Inspection/Observation: Patient demonstrates mild swelling over the right posterolateral ankle.   Gait: Patient ambulates with CAM walking boot on right foot/ankle. During gait without walking boot, the patient demonstrates mild antalgic gait pattern.    Palpation: Patient demonstrates no apparent TTP over the lateral ankle ligaments.   Sensation: Patient is with intact sensation.     ROM:  Ankle Motion PROM AROM    Right Left Right Left   Ankle OKC DF (knee straight) N/A N/A 20 N/A   Ankle OKC PF N/A N/A 45 N/A   Great Toe Ext 85 85 N/A N/A   Ankle CKC DF (knee bent) N/A N/A 40 40   *indicates activity was associated with pain     Strength/MMT:   Ankle Motion Strength    Right Left   Ankle OKC DF 4/5 5/5   Ankle OKC PF 4+/5 5/5   Ankle OKC INV/forefoot ADD 5/5 5/5   Ankle OKC EV/forefoot ABD 4/5 5/5   *indicates activity was associated with pain     Special Tests:   Ankle and Foot Special Tests:  Anterior Drawer Test: (ATF): (R) (+), (L) (-)  Talar Tilt Inversion Test (ATF and CF): (R) (+), (L) (-)  Talar Tilt Eversion Test (deltoid): (R) (-), (L) (-)  Kleiger's (ER) Test (syndesmosis): (R) (-), (L) (-)  Percussion (Bump) Test: (R) (-), (L) (-)  Compression (Squeeze) Test: (R) (-), (L) (-)  Tinel's Sign: (R) (-), (L) (-)  Ace Test: (R) (-), (L) (-)     ASSESSMENT:     ABBY is a 64 year old female that presents to physical therapy evaluation s/p (R) distal lateral malleolus fracture and inversion ankle sprain 10/19/24 when she tripped on uneven [employer]. Overall, the patient demonstrates great ankle DF mobility with no apparent loss of mobility on her involved side compared to the uninvolved side. She has been walking in a walking boot but based on her available mobility, she was encouraged to wean from using the boot at home over the next few days, eventually to discontinue boot use for all daily ambulation within the next week. The patient demonstrates anticipated deficits in ankle strength, endurance, stability, and balance. Current functional limitations include, but are not limited to, normalized gait pattern, walking long distances, and navigating stairs. Abby would like to be able to resume hiking activities which will require navigating uneven  terrain. The patient would benefit from physical therapy including manual therapy interventions to address soft tissue and joint mobility restrictions as well as exercise interventions for progression in mobility, strength, endurance, stabilization, and neuromuscular control to facilitate improved pain and symptom management, improved tolerance to ADL's, and return to PLF.     Precautions/WB Status: Cleared to WBAT in boot. Recommend to begin with range-of-motion exercises such as gentle ankle pumps and circles to improve flexibility in the ankle joint. Progress to isometric strengthening exercises, including towel scrunches and toe curls, to engage foot and ankle muscles without placing undue stress on the healing fracture. Once tolerated, incorporate resistance exercises like theraband plantarflexion, dorsiflexion, inversion, and eversion to build strength gradually. Balance training, such as single-leg stands and weight shifts, can be introduced as pain allows to restore proprioception. Finally, progress to functional exercises, including gentle calf raises and controlled step-ups, advancing to weight-bearing activities as tolerated. This protocol should be tailored to the patient's tolerance, ensuring all exercises are pain-free, with adjustments as needed to avoid exacerbating symptoms   Education or Treatment Limitation(s): None  Rehab Potential: Good    TREATMENT:     Initial Evaluation: x 20min     Therapeutic Activities: x 3min  Patient Education: Patient was educated on anatomy and pathophysiology of current condition, rationale for physical therapy, anticipated treatment interventions, prognosis, timeline for recovery, and expected functional outcomes based on evaluative findings.     Therapeutic Exercise: x 8min  4-way ankle theraband: x 20 (R), red theraband  DLHR: x 20   Gastroc stretch: x 30\" (B)   Administered HEP: Reviewed HEP handout, exercise selection, and recommended resistance. Provided verbal  and written instructions/cueing for proper technique and common errors/compensations as needed.   Patient Education: Patient was educated on the importance of compliance with consistent treatment and HEP to achieve mutually established goals. Patient verbalized understanding.     Neuromuscular Re-education: x 2min  Tandem stance balance: 2 x 20\" (B)     Manual Therapy: x 7min  Edema massage to right foot and ankle x 5min  Talocrural joint distraction: 3 x 20\"   Posterior talocrural joint mobs: Grade 3, 4 x 10\" (R)      Home Exercise Program:   Access Code: [email or web address]  URL: [email or web address]  Date: 11/21/2024  Prepared by: Laura Mario    Exercises  - Seated Ankle Inversion with Resistance and Legs Crossed  - 1 x daily - 7 x weekly - 2 sets - 10 reps  - Long Sitting Ankle Eversion with Resistance  - 1 x daily - 7 x weekly - 2 sets - 10 reps  - Ankle Dorsiflexion with Resistance  - 1 x daily - 7 x weekly - 2 sets - 10 reps  - Ankle and Toe Plantarflexion with Resistance  - 1 x daily - 7 x weekly - 2 sets - 10 reps  - Standing Heel Raise  - 1 x daily - 7 x weekly - 2 sets - 10 reps  - Tandem Stance  - 1 x daily - 7 x weekly - 1 sets - 2 reps - 30\" hold  - Gastroc Stretch on Wall  - 1 x daily - 7 x weekly - 1 sets - 1 reps - 30\" hold    Provider Interactions With Patient:   All patient's questions were answered and the patient denied further comments, complaints, or concerns upon departure.   Patient was issued an appt list and verbally confirmed the next appt date and time to ensure consistency with physical therapy attendance.     Charges: PT Eval Low Complexity Complexity x 1, MT x 1, Therex x 1  Total Timed Treatment: 20min       Total Treatment Time: 40min     PLAN OF CARE:      Goals:  Short-Term Goals:  Patient will discontinue use of CAM walking boot for daily ambulation. Timeframe: 2-3 visits.   Patient will improve ankle mobility, strength, and endurance to facilitate ability to stand  for 2 hour(s) daily for community integration. Timeframe: 4 visits.  Long-Term Goals:  Patient will improve ankle mobility, strength, and endurance to facilitate walking distances of 1 mile(s) daily for household and community ambulation. Timeframe: 6 visits.  Patient will improve ankle mobility and strength for reciprocal stair navigation pattern with no asymmetries for ascending and descending a flight of stairs daily for household and community integration. Timeframe: 6 visits.  Patient will improve ankle mobility, strength, and endurance to facilitate walking distances of 2 mile(s) daily for household and community ambulation. Timeframe: 8 visits.  Patient will improve ankle mobility and strength for reciprocal stair navigation pattern with no asymmetries for ascending and descending 3 flights of stairs daily for household and community integrtion. Timeframe: 8 visits.  The patient will improve ankle strength and stability to facilitate navigating uneven terrain for hiking for general health and fitness. Timeframe: 12 visits  Patient will improve LEFS score by at least 9 points to indicate a true change in improved function for ADL's and restoring PLF. Timeframe: 12 visits.    Plan Frequency / Duration: Patient will be seen for 2x/week for 6 weeks, for a total of 12 visits, over a 90 day period. We will re-evaluate the patient at that time in order to determine functional progress, evaluate short-term goal completion, and establish an updated plan of care. Possible treatment interventions will/may include: Therapeutic Activities, Therapeutic Exercise, Neuromuscular Re-education, Manual Therapy, Home Exercise Program Instruction, Patient Education, Self-Care/Home Management, Gait Training, and Modalities as needed.     Patient/Family was advised of these findings, precautions, and treatment options and has agreed to actively participate in planning and for this course of care.    Thank you for your referral.  Please co-sign or sign and return this letter via fax as soon as possible to [phone]. If you have any questions, please contact me at Dept: [phone].    Sincerely,    X___Laura Mario PT, DPT, SCS, ATC, CSCS____ Date: __11/20/2024________    Electronically signed by therapist: Laura Rivero PT  Physician's certification required: Yes  I certify the need for these services furnished under this plan of treatment and while under my care.

## 2024-11-21 NOTE — PATIENT INSTRUCTIONS
Thank you for coming to your physical therapy appt! During your appt today you were issued a home exercise program with a printed handout from [employer]. Your home exercise program can also be accessed using the information below. The selected exercises are meant to supplement the treatment you received and reinforce your progress made in physical therapy. Please complete these exercises as instructed by your physical therapist. It is important to stay consistent with your exercises to maximize your recovery!       Access Code: [email or web address]  URL: [email or web address]  Date: 11/21/2024  Prepared by: Laura Mario    Exercises  - Seated Ankle Inversion with Resistance and Legs Crossed  - 1 x daily - 7 x weekly - 2 sets - 10 reps  - Long Sitting Ankle Eversion with Resistance  - 1 x daily - 7 x weekly - 2 sets - 10 reps  - Ankle Dorsiflexion with Resistance  - 1 x daily - 7 x weekly - 2 sets - 10 reps  - Ankle and Toe Plantarflexion with Resistance  - 1 x daily - 7 x weekly - 2 sets - 10 reps  - Standing Heel Raise  - 1 x daily - 7 x weekly - 2 sets - 10 reps  - Tandem Stance  - 1 x daily - 7 x weekly - 1 sets - 2 reps - 30\" hold  - Gastroc Stretch on Wall  - 1 x daily - 7 x weekly - 1 sets - 1 reps - 30\" hold

## 2024-11-22 ENCOUNTER — OFFICE VISIT (OUTPATIENT)
Dept: PHYSICAL THERAPY | Age: 64
End: 2024-11-22
Attending: FAMILY MEDICINE
Payer: MEDICARE

## 2024-11-22 PROCEDURE — 97530 THERAPEUTIC ACTIVITIES: CPT

## 2024-11-22 PROCEDURE — 97140 MANUAL THERAPY 1/> REGIONS: CPT

## 2024-11-22 PROCEDURE — 97110 THERAPEUTIC EXERCISES: CPT

## 2024-11-22 NOTE — PROGRESS NOTES
Date of service: 11/22/2024  Dx: Closed fracture of right ankle with routine healing, subsequent encounter (S82.891D), Closed nondisplaced fracture of lateral malleolus of right fibula with routine healing, subsequent encounter (S82.64XD), Sprain of anterior talofibular ligament of right ankle, subsequent encounter (S93.491D)       Insurance: MEDICARE PART A&B  Insurance Limits: N/A    Visit #: 2  Authorized # of Visits: 12 recommended  POC/Auth Expiration: N/A  Authorizing Physician/Provider: Helene   Insurance Primary/Secondary: MEDICARE / [employer] [place] INDEMNITY        Subjective: Patient reports compliance with HEP, and has been weaning off CAM boot. Patient reports first full day without the boot was yesterday, was up/down stairs several times and moderate activity throughout the day with some aching reported after.   Pain: 1/10 beginning tx and at worst    Objective:     Assessment: Patient has progressed to one full day tolerated out of CAM boot after just 2 visits, did have some increased swelling that presented laterally only this day. Patient educated on combining ice with elevation to address especially towards end of day, and continue progression of weight bearing per tolerance. Patient was able to begin forward and lateral step ups this day with only end range restriction reported, but no pain. Consider lateral heel taps from 4\" step next visit, and continue to monitor swelling as activity and WB increase.      Goals:   Short-Term Goals:  Patient will discontinue use of CAM walking boot for daily ambulation. Timeframe: 2-3 visits.   Patient will improve ankle mobility, strength, and endurance to facilitate ability to stand for 2 hour(s) daily for community integration. Timeframe: 4 visits.  Long-Term Goals:  Patient will improve ankle mobility, strength, and endurance to facilitate walking distances of 1 mile(s) daily for household and community ambulation. Timeframe: 6 visits.  Patient will improve ankle  mobility and strength for reciprocal stair navigation pattern with no asymmetries for ascending and descending a flight of stairs daily for household and community integration. Timeframe: 6 visits.  Patient will improve ankle mobility, strength, and endurance to facilitate walking distances of 2 mile(s) daily for household and community ambulation. Timeframe: 8 visits.  Patient will improve ankle mobility and strength for reciprocal stair navigation pattern with no asymmetries for ascending and descending 3 flights of stairs daily for household and community integrtion. Timeframe: 8 visits.  The patient will improve ankle strength and stability to facilitate navigating uneven terrain for hiking for general health and fitness. Timeframe: 12 visits  Patient will improve LEFS score by at least 9 points to indicate a true change in improved function for ADL's and restoring PLF. Timeframe: 12 visits.    Plan: Progress stability and range as tolerated for right ankle/foot.  Date: 11/22/2024  TX#: 2/12 Date:                 TX#: 3/ Date:                 TX#: 4/ Date:                 TX#: 5/ Date:   Tx#: 6/   Ther Ex: x 20 min  4-way ankle theraband: x 20 (R), red theraband   DLHR x 20   Lunging Calf Stretch 2 x 30\" ea R/L  Ther Act: x 8 min  FSU 6\" x 10 ea R/L  LSU 6\" x 10 ea R/L   Neuro: x 3 min  Tandem Stance 2 x 30\" ea R/L        Manual: 10'  Assess Ankle/Foot (R)  Edema Massage (R) 5'                     HEP: Access Code: [email or web address]  URL: [email or web address]  Date: 11/21/2024  Prepared by: Laura Mario     Exercises  - Seated Ankle Inversion with Resistance and Legs Crossed  - 1 x daily - 7 x weekly - 2 sets - 10 reps  - Long Sitting Ankle Eversion with Resistance  - 1 x daily - 7 x weekly - 2 sets - 10 reps  - Ankle Dorsiflexion with Resistance  - 1 x daily - 7 x weekly - 2 sets - 10 reps  - Ankle and Toe Plantarflexion with Resistance  - 1 x daily - 7 x weekly - 2 sets - 10 reps  - Standing Heel  Raise  - 1 x daily - 7 x weekly - 2 sets - 10 reps  - Tandem Stance  - 1 x daily - 7 x weekly - 1 sets - 2 reps - 30\" hold  - Gastroc Stretch on Wall  - 1 x daily - 7 x weekly - 1 sets - 1 reps - 30\" hold    Charges: Ther Ex x 1, Ther Act x 1, Manual x 1         Total Timed Treatment: 41 min    Total Treatment Time: 41 min

## 2024-11-27 ENCOUNTER — OFFICE VISIT (OUTPATIENT)
Dept: PHYSICAL THERAPY | Age: 64
End: 2024-11-27
Attending: FAMILY MEDICINE
Payer: MEDICARE

## 2024-11-27 PROCEDURE — 97110 THERAPEUTIC EXERCISES: CPT

## 2024-11-27 PROCEDURE — 97140 MANUAL THERAPY 1/> REGIONS: CPT

## 2024-11-27 NOTE — PROGRESS NOTES
Date of service: 11/27/2024  Dx: Closed fracture of right ankle with routine healing, subsequent encounter (S82.891D), Closed nondisplaced fracture of lateral malleolus of right fibula with routine healing, subsequent encounter (S82.64XD), Sprain of anterior talofibular ligament of right ankle, subsequent encounter (S93.491D)       Insurance: MEDICARE PART A&B  Insurance Limits: N/A    Visit #: 3  Authorized # of Visits: 12 recommended  POC/Auth Expiration: N/A  Authorizing Physician/Provider: Helnee   Insurance Primary/Secondary: MEDICARE / [place] INDEMNITY        Subjective: Patient reports some increase in pain/stiffness since spending more time outside of CAM boot, reports most difficulty and limitation with pointing ankle/foot down. Patient reports ascending stairs is no issue, descending is difficult.  Pain: 2-3/10 beginning tx, worst 3/10 with putting on shoes and descending stairs     Objective:     Assessment: Patient returned to PT this day reporting some increase in symptoms since reducing time in CAM boot to out of the house only. Patient care began with addressing adhesions throughout lower leg posteriorly with IASTM and cupping to trigger points/tissue restrictions, then began self mobilization to calf with ball and variations of stretching for gastroc-soleus complex. Patient was then able to better tolerate side step ups to 6\" step, lateral step downs and forward step downs from 4\". Patient still with some difficulty and symptomatic with DLHR, but improving.    Goals:   Short-Term Goals:  Patient will discontinue use of CAM walking boot for daily ambulation. Timeframe: 2-3 visits.   Patient will improve ankle mobility, strength, and endurance to facilitate ability to stand for 2 hour(s) daily for community integration. Timeframe: 4 visits.  Long-Term Goals:  Patient will improve ankle mobility, strength, and endurance to facilitate walking distances of 1 mile(s) daily for household and community  ambulation. Timeframe: 6 visits.  Patient will improve ankle mobility and strength for reciprocal stair navigation pattern with no asymmetries for ascending and descending a flight of stairs daily for household and community integration. Timeframe: 6 visits.  Patient will improve ankle mobility, strength, and endurance to facilitate walking distances of 2 mile(s) daily for household and community ambulation. Timeframe: 8 visits.  Patient will improve ankle mobility and strength for reciprocal stair navigation pattern with no asymmetries for ascending and descending 3 flights of stairs daily for household and community integrtion. Timeframe: 8 visits.  The patient will improve ankle strength and stability to facilitate navigating uneven terrain for hiking for general health and fitness. Timeframe: 12 visits  Patient will improve LEFS score by at least 9 points to indicate a true change in improved function for ADL's and restoring PLF. Timeframe: 12 visits.    Plan: Progress stability and range as tolerated for right ankle/foot.  Date: 11/22/2024  TX#: 2/12 Date:  11/27/2024           TX#: 3/12 Date:                 TX#: 4/ Date:                 TX#: 5/ Date:   Tx#: 6/   Ther Ex: x 20 min  4-way ankle theraband: x 20 (R), red theraband   DLHR x 20   Lunging Calf Stretch 2 x 30\" ea R/L  Ther Act: x 8 min  FSU 6\" x 10 ea R/L  LSU 6\" x 10 ea R/L   Neuro: x 3 min  Tandem Stance 2 x 30\" ea R/L  Ther Ex: x 10 min  Calf Mob with Ball x 20 (R)  Lunging Calf Stretch (Gastroc) 30\" ea R/L  Lunging Calf Stretch (Soleus) 30\" ea R/L  Calf in Cabinet Stretch 30\" ea R/L  DLHR 2 x 10    Ther Act: x 8 min   LSU/Over 6\" x 10 ea way  Lateral Heel Tap x 10 ea R/L  Forward Step Down 4\" x 10 ea R/L   Neuro: x 2 min  Tandem Stance 2 x 30\" ea R/L       Manual: 10'  Assess Ankle/Foot (R)  Edema Massage (R) 5' Manual: 23'  Cupping/IASTM: Distal Lower Leg   TrPR: Soleus, Lateral Gastroc, Peroneals                       HEP: Access Code:  [email or web address]  URL: [email or web address]  Date: 11/21/2024  Prepared by: Laura Mario     Exercises  - Seated Ankle Inversion with Resistance and Legs Crossed  - 1 x daily - 7 x weekly - 2 sets - 10 reps  - Long Sitting Ankle Eversion with Resistance  - 1 x daily - 7 x weekly - 2 sets - 10 reps  - Ankle Dorsiflexion with Resistance  - 1 x daily - 7 x weekly - 2 sets - 10 reps  - Ankle and Toe Plantarflexion with Resistance  - 1 x daily - 7 x weekly - 2 sets - 10 reps  - Standing Heel Raise  - 1 x daily - 7 x weekly - 2 sets - 10 reps  - Tandem Stance  - 1 x daily - 7 x weekly - 1 sets - 2 reps - 30\" hold  - Gastroc Stretch on Wall  - 1 x daily - 7 x weekly - 1 sets - 1 reps - 30\" hold    Charges: Ther Ex x 1, Manual x 2        Total Timed Treatment: 43 min    Total Treatment Time: 43 min

## 2024-12-03 ENCOUNTER — OFFICE VISIT (OUTPATIENT)
Dept: PHYSICAL THERAPY | Age: 64
End: 2024-12-03
Attending: FAMILY MEDICINE
Payer: MEDICARE

## 2024-12-03 PROCEDURE — 97110 THERAPEUTIC EXERCISES: CPT

## 2024-12-03 PROCEDURE — 97140 MANUAL THERAPY 1/> REGIONS: CPT

## 2024-12-03 PROCEDURE — 97530 THERAPEUTIC ACTIVITIES: CPT

## 2024-12-03 NOTE — PROGRESS NOTES
Date of service: 12/03/2024  Dx: Closed fracture of right ankle with routine healing, subsequent encounter (S82.891D), Closed nondisplaced fracture of lateral malleolus of right fibula with routine healing, subsequent encounter (S82.64XD), Sprain of anterior talofibular ligament of right ankle, subsequent encounter (S93.491D)       Insurance: MEDICARE PART A&B  Insurance Limits: N/A    Visit #: 4  Authorized # of Visits: 12 recommended  POC/Auth Expiration: N/A  Authorizing Physician/Provider: Helene   Insurance Primary/Secondary: MEDICARE / [place]DEMN[place]        Subjective: Patient reports increased soreness this day following 3 hours of standing for holiday nimisha event put on yesterday.  Pain: NT 12/03    Objective:     Assessment: Patient reports some increased stiffness and soreness since last visit after 3 hours of static standing for \"[employer] [place] [employer]\" when packing boxes of toys, clothes, etc as she still wears the boot outside of the house, but otherwise is better tolerating with little to no pain walking around the house without the boot at all times. Patient advised to begin time out of the house without the boot as well to better see carry over of ankle mobility. Patient would benefit from updated HEP next visit to progress closed chain and functional ankle PF/DF    Goals:   Short-Term Goals:  Patient will discontinue use of CAM walking boot for daily ambulation. Timeframe: 2-3 visits.   Patient will improve ankle mobility, strength, and endurance to facilitate ability to stand for 2 hour(s) daily for community integration. Timeframe: 4 visits.  Long-Term Goals:  Patient will improve ankle mobility, strength, and endurance to facilitate walking distances of 1 mile(s) daily for household and community ambulation. Timeframe: 6 visits.  Patient will improve ankle mobility and strength for reciprocal stair navigation pattern with no asymmetries for ascending and descending a flight of stairs  daily for household and community integration. Timeframe: 6 visits.  Patient will improve ankle mobility, strength, and endurance to facilitate walking distances of 2 mile(s) daily for household and community ambulation. Timeframe: 8 visits.  Patient will improve ankle mobility and strength for reciprocal stair navigation pattern with no asymmetries for ascending and descending 3 flights of stairs daily for household and community integrtion. Timeframe: 8 visits.  The patient will improve ankle strength and stability to facilitate navigating uneven terrain for hiking for general health and fitness. Timeframe: 12 visits  Patient will improve LEFS score by at least 9 points to indicate a true change in improved function for ADL's and restoring PLF. Timeframe: 12 visits.    Plan: Progress stability and range as tolerated for right ankle/foot.  Date: 11/22/2024  TX#: 2/12 Date:  11/27/2024           TX#: 3/12 Date: 12/03/2024            TX#: 4/12 Date:                 TX#: 5/ Date:   Tx#: 6/   Ther Ex: x 20 min  4-way ankle theraband: x 20 (R), red theraband   DLHR x 20   Lunging Calf Stretch 2 x 30\" ea R/L  Ther Act: x 8 min  FSU 6\" x 10 ea R/L  LSU 6\" x 10 ea R/L   Neuro: x 3 min  Tandem Stance 2 x 30\" ea R/L  Ther Ex: x 10 min  Calf Mob with Ball x 20 (R)  Lunging Calf Stretch (Gastroc) 30\" ea R/L  Lunging Calf Stretch (Soleus) 30\" ea R/L  Calf in Cabinet Stretch 30\" ea R/L  DLHR 2 x 10    Ther Act: x 8 min   LSU/Over 6\" x 10 ea way  Lateral Heel Tap x 10 ea R/L  Forward Step Down 4\" x 10 ea R/L   Neuro: x 2 min  Tandem Stance 2 x 30\" ea R/L  Ther Ex: x 15 min  Calf Mob with Ball x 20 (R)  Lunging Calf Stretch (Gastroc) 30\" ea R/L  Lunging Calf Stretch (Soleus) 30\" ea R/L  DLHR 2 x 10    Repeat Lunging Calf Stretch (Gastroc) 30\" ea R/L  Repeat Lunging Calf Stretch (Soleus) 30\" ea R/L  Ther Act: x 8 min   Lateral Heel Tap 6\" x 10 ea R/L  Forward Step Down 6\" x 10 ea R/L   Neuro: x 5 min  Tandem Stance 2 x 30\" ea R/L    Added SLS 2 x 30\" ea R/L        Manual: 10'  Assess Ankle/Foot (R)  Edema Massage (R) 5' Manual: 23'  Cupping/IASTM: Distal Lower Leg   TrPR: Soleus, Lateral Gastroc, Peroneals    Manual: x 15 min  Cupping: Lateral Calf (R)  Active TrPR: Soleus, Lateral Gastroc                   HEP: Access Code: [email or web address]  URL: [email or web address]  Date: 11/21/2024  Prepared by: Laura Mario     Exercises  - Seated Ankle Inversion with Resistance and Legs Crossed  - 1 x daily - 7 x weekly - 2 sets - 10 reps  - Long Sitting Ankle Eversion with Resistance  - 1 x daily - 7 x weekly - 2 sets - 10 reps  - Ankle Dorsiflexion with Resistance  - 1 x daily - 7 x weekly - 2 sets - 10 reps  - Ankle and Toe Plantarflexion with Resistance  - 1 x daily - 7 x weekly - 2 sets - 10 reps  - Standing Heel Raise  - 1 x daily - 7 x weekly - 2 sets - 10 reps  - Tandem Stance  - 1 x daily - 7 x weekly - 1 sets - 2 reps - 30\" hold  - Gastroc Stretch on Wall  - 1 x daily - 7 x weekly - 1 sets - 1 reps - 30\" hold    Charges: Ther Ex x 1, Manual x 1, Ther Act x 1        Total Timed Treatment: 43 min    Total Treatment Time: 43 min

## 2024-12-06 ENCOUNTER — OFFICE VISIT (OUTPATIENT)
Dept: PHYSICAL THERAPY | Age: 64
End: 2024-12-06
Attending: FAMILY MEDICINE
Payer: MEDICARE

## 2024-12-06 PROCEDURE — 97112 NEUROMUSCULAR REEDUCATION: CPT

## 2024-12-06 PROCEDURE — 97140 MANUAL THERAPY 1/> REGIONS: CPT

## 2024-12-06 PROCEDURE — 97110 THERAPEUTIC EXERCISES: CPT

## 2024-12-06 NOTE — PROGRESS NOTES
Date of Service: 12/6/2024  Dx: Closed fracture of right ankle with routine healing, subsequent encounter (S82.891D), Closed nondisplaced fracture of lateral malleolus of right fibula with routine healing, subsequent encounter (S82.64XD), Sprain of anterior talofibular ligament of right ankle, subsequent encounter (S93.491D)             Insurance: MEDICARE PART A&B  Insurance Limits: N/A  Visit #: 5  Authorized # of Visits: 12 recommended  POC/Auth Expiration: N/A  Date of Last PN: 11/20/24 (Visit #1)  Authorizing Physician/Provider: Helene Ceballos MD visit: None Scheduled  Fall Risk: Standard         Precautions: None            Subjective: The patient reports that her pain is under better control and that she is able to do a little bit more each day. She feels she is walking better. She has been doing her exercises at night because that seems to help with the stiffness she has at the end of the day.     Objective:     Pain/Symptom Presentation: Patient complains predominantly of stiffness.    Resting pain pre-tx: 1/10  Pain at worst: 3-4/10    HEP:   Access Code: [email or web address]  URL: [email or web address]  Date: 11/21/2024  Prepared by: Laura Mario     Exercises  - Seated Ankle Inversion with Resistance and Legs Crossed  - 1 x daily - 7 x weekly - 2 sets - 10 reps  - Long Sitting Ankle Eversion with Resistance  - 1 x daily - 7 x weekly - 2 sets - 10 reps  - Ankle Dorsiflexion with Resistance  - 1 x daily - 7 x weekly - 2 sets - 10 reps  - Ankle and Toe Plantarflexion with Resistance  - 1 x daily - 7 x weekly - 2 sets - 10 reps  - Standing Heel Raise  - 1 x daily - 7 x weekly - 2 sets - 10 reps  - Tandem Stance  - 1 x daily - 7 x weekly - 1 sets - 2 reps - 30\" hold  - Gastroc Stretch on Wall  - 1 x daily - 7 x weekly - 1 sets - 1 reps - 30\" hold    Treatment:  Therapeutic Activities: x 10min  Lateral Heel Tap: 2 x 10 (B), 6\" step   Forward Step Down: 2 x 10 (B), 6\" step  Shuttle DLHR: x 20, 3  cords  SLHR: x 20 (B), 2 cords    Therapeutic Exercise: x 10min  Lunging Calf Stretch (Gastroc) 30\" ea R/L  Lunging Calf Stretch (Soleus) 30\" ea R/L  DLHR 2 x 10    Toe walk in parallel bars: x 2 laps     Neuromuscular Re-education: x 10min  Tandem Stance 2 x 30\" (B)   Tandem stance balance, EC: 2 x 30\" (B)  SLS: 2 x 30\" (B)   Ankle wobble/balance board: A/P, M/L taps x 20, balance 2 x 20\"     Manual Therapy: x 10min  Soft tissue mobilization (prone): right calf, gastroc, soleus x 10min    Provider Interactions With Patient:   Added therapeutic exercises as documented, with cueing provided throughout performance to ensure correct technique during exercise.  Verbally confirmed the patient's next appt date and time to ensure consistency with physical therapy attendance.     Assessment: Abby was taken through additional ankle strength and balance exercises this date, which she tolerated well. The patient sometimes have some stiffness complaints with certain exercises, but it generally loosens up after a few repetitions and the patient is able to complete all requested repetitions. The patient would benefit from continued PT for further progression in ankle strength and stability for full return to ADL's.     Goals:   Short-Term Goals:  Patient will discontinue use of CAM walking boot for daily ambulation. Timeframe: 2-3 visits.   Patient will improve ankle mobility, strength, and endurance to facilitate ability to stand for 2 hour(s) daily for community integration. Timeframe: 4 visits.  Long-Term Goals:  Patient will improve ankle mobility, strength, and endurance to facilitate walking distances of 1 mile(s) daily for household and community ambulation. Timeframe: 6 visits.  Patient will improve ankle mobility and strength for reciprocal stair navigation pattern with no asymmetries for ascending and descending a flight of stairs daily for household and community integration. Timeframe: 6 visits.  Patient will improve  ankle mobility, strength, and endurance to facilitate walking distances of 2 mile(s) daily for household and community ambulation. Timeframe: 8 visits.  Patient will improve ankle mobility and strength for reciprocal stair navigation pattern with no asymmetries for ascending and descending 3 flights of stairs daily for household and community integrtion. Timeframe: 8 visits.  The patient will improve ankle strength and stability to facilitate navigating uneven terrain for hiking for general health and fitness. Timeframe: 12 visits  Patient will improve LEFS score by at least 9 points to indicate a true change in improved function for ADL's and restoring PLF. Timeframe: 12 visits.    Plan: Continue to progress calf and ankle stabilization strengthening.       Charges: MT x 1, Therex x 1, NMR x 1          Total Timed Treatment: 40min    Total Treatment Time: 40min

## 2024-12-10 ENCOUNTER — OFFICE VISIT (OUTPATIENT)
Dept: PHYSICAL THERAPY | Age: 64
End: 2024-12-10
Attending: FAMILY MEDICINE
Payer: MEDICARE

## 2024-12-10 PROCEDURE — 97530 THERAPEUTIC ACTIVITIES: CPT

## 2024-12-10 PROCEDURE — 97140 MANUAL THERAPY 1/> REGIONS: CPT

## 2024-12-10 PROCEDURE — 97110 THERAPEUTIC EXERCISES: CPT

## 2024-12-10 NOTE — PROGRESS NOTES
Date of Service: 12/10/2024  Dx: Closed fracture of right ankle with routine healing, subsequent encounter (S82.891D), Closed nondisplaced fracture of lateral malleolus of right fibula with routine healing, subsequent encounter (S82.64XD), Sprain of anterior talofibular ligament of right ankle, subsequent encounter (S93.491D)             Insurance: MEDICARE PART A&B  Insurance Limits: N/A  Visit #: 6  Authorized # of Visits: 12 recommended  POC/Auth Expiration: N/A  Date of Last PN: 11/20/24 (Visit #1)  Authorizing Physician/Provider: Helene Ceballos MD visit: None Scheduled  Fall Risk: Standard         Precautions: None            Subjective: Patient reports some increased soreness after last visit the following day, had more soreness by second day but was feeling much better by day 3 after walking around more. Patient reports about 60% return to function as of now and compliance to HEP.     Objective:     Pain/Symptom Presentation: Patient complains predominantly of stiffness.    Resting pain pre-tx: 1/10  Pain at worst: 3/10    HEP:   Access Code: [email or web address]  URL: [email or web address]  Date: 12/10/2024  Prepared by: Hyacinth Camacho    Exercises  - Supine Ankle Pumps with Ball   - 1-2 x daily - 7 x weekly - 1 sets - 20 reps  - Gastroc Stretch on Wall  - 1 x daily - 7 x weekly - 3 sets - 10 reps  - Soleus Stretch on Wall  - 1 x daily - 7 x weekly - 3 sets - 10 reps  - Single Leg Stance  - 1 x daily - 7 x weekly - 3 sets - 30s hold  - Forward Step Down  - 1 x daily - 7 x weekly - 1 sets - 10 reps    Treatment:  Therapeutic Activities: x 12 min  Added Long sitting Calf Mob with Ball x 20 (R)   Lateral Heel Tap: 2 x 10 (B), 6\" step   Forward Step Down and Tap: x 10 (B), 6\" step  Shuttle DLHR: x 20, 3 cords **incr next visit  SLHR: x 20 (B), 2 cords **incr next visit     Therapeutic Exercise: x 10 min  DLHR 2 x 10    Lunging Calf Stretch (Gastroc) 30\" ea R/L  Lunging Calf Stretch (Soleus) 30\" ea R/L  Toe  walk in parallel bars: x 2 laps     Neuromuscular Re-education: x 8 min  Tandem Stance on Airex 2 x 30\" (B)   SLS: 2 x 30\" (B)   Manual Therapy: x 12 min  Soft tissue mobilization/cupping (prone): right calf, gastroc, soleus    Provider Interactions With Patient:   Added therapeutic exercises as documented, with cueing provided throughout performance to ensure correct technique during exercise.  Verbally confirmed the patient's next appt date and time to ensure consistency with physical therapy attendance.     Assessment: Patient provided with updated HEP this day to continue carry over of ankle mobility between sessions as ankle DF is still limited at times with descending stairs per patient reports. Patient reports approximately 60% return of function at this time, would benefit from reassessment next visit and follow up on response to changes made in HEP, consider reducing to once a week for 3-4 more weeks    Goals:   Short-Term Goals:  Patient will discontinue use of CAM walking boot for daily ambulation. Timeframe: 2-3 visits.   Patient will improve ankle mobility, strength, and endurance to facilitate ability to stand for 2 hour(s) daily for community integration. Timeframe: 4 visits.  Long-Term Goals:  Patient will improve ankle mobility, strength, and endurance to facilitate walking distances of 1 mile(s) daily for household and community ambulation. Timeframe: 6 visits.  Patient will improve ankle mobility and strength for reciprocal stair navigation pattern with no asymmetries for ascending and descending a flight of stairs daily for household and community integration. Timeframe: 6 visits.  Patient will improve ankle mobility, strength, and endurance to facilitate walking distances of 2 mile(s) daily for household and community ambulation. Timeframe: 8 visits.  Patient will improve ankle mobility and strength for reciprocal stair navigation pattern with no asymmetries for ascending and descending 3  flights of stairs daily for household and community integrtion. Timeframe: 8 visits.  The patient will improve ankle strength and stability to facilitate navigating uneven terrain for hiking for general health and fitness. Timeframe: 12 visits  Patient will improve LEFS score by at least 9 points to indicate a true change in improved function for ADL's and restoring PLF. Timeframe: 12 visits.    Plan: Continue to progress calf and ankle stabilization strengthening.       Charges: MT x 1, Therex x 1, Ther Act x 1         Total Timed Treatment: 42 min    Total Treatment Time: 42 min

## 2024-12-12 NOTE — PROGRESS NOTES
Date of Service: 12/13/2024  Dx: Closed fracture of right ankle with routine healing, subsequent encounter (S82.891D), Closed nondisplaced fracture of lateral malleolus of right fibula with routine healing, subsequent encounter (S82.64XD), Sprain of anterior talofibular ligament of right ankle, subsequent encounter (S93.491D)             Insurance: MEDICARE PART A&B  Insurance Limits: N/A  Visit #: 7  Authorized # of Visits: 12 recommended  POC/Auth Expiration: N/A  Date of Last PN: 11/20/24 (Visit #1)  Authorizing Physician/Provider: Helene Ceballos MD visit: None Scheduled  Fall Risk: Standard         Precautions: None            Subjective: Abby reports that her ankle is a bit sore and tired today. She reports that she went [place] shopping with her friend yesterday. She reports that she had to navigate uneven terrain. She reports that she stretched \"really good last night.\" She used the ball to massage her leg, using her hand to roll it up and down her leg.     The patient reports some difficulty still descending the stairs.     Objective:     Pain/Symptom Presentation:    Resting pain pre-tx: 1/10  Pain at worst: 3/10    HEP:   Access Code: [email or web address]  URL: [email or web address]  Date: 12/10/2024  Prepared by: Hyacinth Camacho    Exercises  - Supine Ankle Pumps with Ball   - 1-2 x daily - 7 x weekly - 1 sets - 20 reps  - Gastroc Stretch on Wall  - 1 x daily - 7 x weekly - 3 sets - 10 reps  - Soleus Stretch on Wall  - 1 x daily - 7 x weekly - 3 sets - 10 reps  - Single Leg Stance  - 1 x daily - 7 x weekly - 3 sets - 30s hold  - Forward Step Down  - 1 x daily - 7 x weekly - 1 sets - 10 reps    Treatment:    Therapeutic Activities: x 4min  Lateral Heel Tap: 2 x 10 (B), 6\" step     Therapeutic Exercise: x 6min  Lunging Calf Stretch (Gastroc): x 30\" ea R/L  Lunging Calf Stretch (Soleus): x 30\" ea R/L  DLHR: 2 x 10      Neuromuscular Re-education: x 17min  Tandem Stance on airex: 2 x 30\" (B)   SLS: 2 x  30\" (B)   Ankle wobble/balance board: A/P, M/L taps x 20, balance 2 x 15\" each   Tandem walk on airex balance beam: x 2 laps  Lateral walk on airex balance beam: x 2 laps   BOSU step-up: 1 x 10 (B), blue side up   BOSU lateral step-up-and-over: 1 x 10 (B), blue side up   Manual Therapy: x 13min  Soft tissue mobilization/cupping (prone): right calf, gastroc, soleus  IASTM: (R) plantar fascia    Provider Interactions With Patient:   Added therapeutic exercises as documented, with cueing provided throughout performance to ensure correct technique during exercise.  Verbally confirmed the patient's next appt date and time to ensure consistency with physical therapy attendance.     Assessment: Abby is increasing her activity level in the community, increasing her walking distances. She still notes some difficulty with the mobility for descending stairs and is still with some strength deficits with lateral movements at her ankle. We progressed ankle stability/balance exercises this date to facilitate improved strength and stability when navigating uneven terrain. The patient would benefit from continued PT to normalize strength and stability for full return to PLF. Patient was added to wait list for more appts the last two weeks of the calendar year.     Goals:   Short-Term Goals:  Patient will discontinue use of CAM walking boot for daily ambulation. Timeframe: 2-3 visits.   Patient will improve ankle mobility, strength, and endurance to facilitate ability to stand for 2 hour(s) daily for community integration. Timeframe: 4 visits.  Long-Term Goals:  Patient will improve ankle mobility, strength, and endurance to facilitate walking distances of 1 mile(s) daily for household and community ambulation. Timeframe: 6 visits.  Patient will improve ankle mobility and strength for reciprocal stair navigation pattern with no asymmetries for ascending and descending a flight of stairs daily for household and community integration.  Timeframe: 6 visits.  Patient will improve ankle mobility, strength, and endurance to facilitate walking distances of 2 mile(s) daily for household and community ambulation. Timeframe: 8 visits.  Patient will improve ankle mobility and strength for reciprocal stair navigation pattern with no asymmetries for ascending and descending 3 flights of stairs daily for household and community integrtion. Timeframe: 8 visits.  The patient will improve ankle strength and stability to facilitate navigating uneven terrain for hiking for general health and fitness. Timeframe: 12 visits  Patient will improve LEFS score by at least 9 points to indicate a true change in improved function for ADL's and restoring PLF. Timeframe: 12 visits.    Plan: Continue to progress calf and ankle stabilization strengthening.       Charges: MT x 1, Therex x 1, NMR x 1        Total Timed Treatment: 40min    Total Treatment Time: 40min

## 2024-12-13 ENCOUNTER — OFFICE VISIT (OUTPATIENT)
Dept: PHYSICAL THERAPY | Age: 64
End: 2024-12-13
Attending: FAMILY MEDICINE
Payer: MEDICARE

## 2024-12-13 PROCEDURE — 97140 MANUAL THERAPY 1/> REGIONS: CPT

## 2024-12-13 PROCEDURE — 97110 THERAPEUTIC EXERCISES: CPT

## 2024-12-13 PROCEDURE — 97112 NEUROMUSCULAR REEDUCATION: CPT

## 2024-12-18 ENCOUNTER — OFFICE VISIT (OUTPATIENT)
Dept: PHYSICAL THERAPY | Age: 64
End: 2024-12-18
Attending: FAMILY MEDICINE
Payer: MEDICARE

## 2024-12-18 PROCEDURE — 97140 MANUAL THERAPY 1/> REGIONS: CPT

## 2024-12-18 PROCEDURE — 97112 NEUROMUSCULAR REEDUCATION: CPT

## 2024-12-18 PROCEDURE — 97110 THERAPEUTIC EXERCISES: CPT

## 2024-12-18 NOTE — PROGRESS NOTES
Date of Service: 12/18/2024  Dx: Closed fracture of right ankle with routine healing, subsequent encounter (S82.261D), Closed nondisplaced fracture of lateral malleolus of right fibula with routine healing, subsequent encounter (S82.64XD), Sprain of anterior talofibular ligament of right ankle, subsequent encounter (S93.491D)             Insurance: MEDICARE PART A&B  Insurance Limits: N/A  Visit #: 8  Authorized # of Visits: 12 recommended  POC/Auth Expiration: N/A  Date of Last PN: 11/20/24 (Visit #1)  Authorizing Physician/Provider: Helene Ceballos MD visit: None Scheduled  Fall Risk: Standard         Precautions: None            Subjective: Patient reports some increased soreness in lower leg almost foot since last visit after BOSU exercises, stating she avoided exercises at home for 3 days due to the swelling and wanted to allow it to stretch. Patient reports significant knotting return in right calf after last visit on \"half ball\"     Objective:     Pain/Symptom Presentation:    Resting pain pre-tx: 3/10  Pain at worst: 4/10 after last visit    HEP:   Access Code: [email or web address]  URL: [email or web address]  Date: 12/10/2024  Prepared by: Hyacinth Camacho    Exercises  - Supine Ankle Pumps with Ball   - 1-2 x daily - 7 x weekly - 1 sets - 20 reps  - Gastroc Stretch on Wall  - 1 x daily - 7 x weekly - 3 sets - 10 reps  - Soleus Stretch on Wall  - 1 x daily - 7 x weekly - 3 sets - 10 reps  - Single Leg Stance  - 1 x daily - 7 x weekly - 3 sets - 30s hold  - Forward Step Down  - 1 x daily - 7 x weekly - 1 sets - 10 reps    Treatment:    Therapeutic Activities: x 5 min  Lateral Heel Tap: 2 x 10 (B), 6\" step     Therapeutic Exercise: x 12 min  Calf Mob with Ball 3-4 x 20 (L) multiple   Lunging Calf Stretch (Gastroc): x 30\" ea R/L  Lunging Calf Stretch (Soleus): x 30\" ea R/L  DLHR: 2 x 10      Neuromuscular Re-education: x 12 min  Tandem Stance on airex: 2 x 30\" (B)   SLS: 2 x 30\" (B)   Ankle wobble/balance  board: A/P, M/L taps x 20,  Ankle wobble/balance board: A/P, M/L Balance 1' each   Manual Therapy: x 15 min  Soft tissue mobilization/cupping (prone): right calf, gastroc, soleus    Provider Interactions With Patient:   Added therapeutic exercises as documented, with cueing provided throughout performance to ensure correct technique during exercise.  Verbally confirmed the patient's next appt date and time to ensure consistency with physical therapy attendance.     Assessment: Patient was with increased symptoms since last visit this day, reporting limited tolerance to balance exercises on BOSU that seemed to aggravate calf. Patient care this day began with increased manual techniques including cupping to address all major adhesions/knots in tissue then followed with self mobilization of calf. Patient was then able to better participate in balance and functional knee/ankle strengthening with less pain after. Plan to see patient for one more visits with PTA then re-evaluate with PT visit 10.    Goals:   Short-Term Goals:  Patient will discontinue use of CAM walking boot for daily ambulation. Timeframe: 2-3 visits.   Patient will improve ankle mobility, strength, and endurance to facilitate ability to stand for 2 hour(s) daily for community integration. Timeframe: 4 visits.  Long-Term Goals:  Patient will improve ankle mobility, strength, and endurance to facilitate walking distances of 1 mile(s) daily for household and community ambulation. Timeframe: 6 visits.  Patient will improve ankle mobility and strength for reciprocal stair navigation pattern with no asymmetries for ascending and descending a flight of stairs daily for household and community integration. Timeframe: 6 visits.  Patient will improve ankle mobility, strength, and endurance to facilitate walking distances of 2 mile(s) daily for household and community ambulation. Timeframe: 8 visits.  Patient will improve ankle mobility and strength for reciprocal  stair navigation pattern with no asymmetries for ascending and descending 3 flights of stairs daily for household and community integrtion. Timeframe: 8 visits.  The patient will improve ankle strength and stability to facilitate navigating uneven terrain for hiking for general health and fitness. Timeframe: 12 visits  Patient will improve LEFS score by at least 9 points to indicate a true change in improved function for ADL's and restoring PLF. Timeframe: 12 visits.    Plan: Continue to progress calf and ankle stabilization strengthening.       Charges: MT x 1, Therex x 1, NMR x 1        Total Timed Treatment: 44 min    Total Treatment Time: 44 min

## 2024-12-20 ENCOUNTER — TELEPHONE (OUTPATIENT)
Dept: PHYSICAL THERAPY | Facility: HOSPITAL | Age: 64
End: 2024-12-20

## 2024-12-26 ENCOUNTER — OFFICE VISIT (OUTPATIENT)
Dept: PHYSICAL THERAPY | Age: 64
End: 2024-12-26
Attending: FAMILY MEDICINE
Payer: MEDICARE

## 2024-12-26 PROCEDURE — 97112 NEUROMUSCULAR REEDUCATION: CPT

## 2024-12-26 PROCEDURE — 97140 MANUAL THERAPY 1/> REGIONS: CPT

## 2024-12-26 PROCEDURE — 97110 THERAPEUTIC EXERCISES: CPT

## 2024-12-26 NOTE — PROGRESS NOTES
Date of Service: 12/26/2024  Dx: Closed fracture of right ankle with routine healing, subsequent encounter (S82.461D), Closed nondisplaced fracture of lateral malleolus of right fibula with routine healing, subsequent encounter (S82.64XD), Sprain of anterior talofibular ligament of right ankle, subsequent encounter (S93.491D)             Insurance: MEDICARE PART A&B  Insurance Limits: N/A  Visit #: 9  Authorized # of Visits: 12 recommended  POC/Auth Expiration: N/A  Date of Last PN: 11/20/24 (Visit #1)  Authorizing Physician/Provider: Helene Ceballos MD visit: None Scheduled  Fall Risk: Standard         Precautions: None            Subjective: Patient reports descending stairs is still difficult but has been better since last visit. Patient reports stiffness hasn't been as bad since implementing self mobilization to calf with tennis ball, less \"knotty\". Patient reports she feels about 80-85% return to normal function at this time, would be content with one more visit then DC to final HEP for self management.    Objective:     Pain/Symptom Presentation:    Resting pain pre-tx: 1/10  Pain at worst: 4/10 after last visit    HEP:   Access Code: [email or web address]  URL: [email or web address]  Date: 12/10/2024  Prepared by: Hyacinth Camacho    Exercises  - Supine Ankle Pumps with Ball   - 1-2 x daily - 7 x weekly - 1 sets - 20 reps  - Gastroc Stretch on Wall  - 1 x daily - 7 x weekly - 3 sets - 10 reps  - Soleus Stretch on Wall  - 1 x daily - 7 x weekly - 3 sets - 10 reps  - Single Leg Stance  - 1 x daily - 7 x weekly - 3 sets - 30s hold  - Forward Step Down  - 1 x daily - 7 x weekly - 1 sets - 10 reps    Treatment:    Therapeutic Exercise: x 15 min  Calf Mob with Ball x 20 (L) multiple   Lunging Calf Stretch (Gastroc): x 30\" ea R/L  Lunging Calf Stretch (Soleus): x 30\" ea R/L  DLHR: 2 x 10    DLHT: 2 x 10     Neuromuscular Re-education: x 15 min  Added Anterior BOSU Lunges x 10 ea R/L  Tandem Stance on airex: 2 x 30\"  (B)   BOSU step-up: 1 x 10 (B), blue side up   BOSU lateral step-up-and-over: 1 x 10 (B), blue side up  SLS: 2 x 30\" (B)   Manual Therapy: x 10 min  Soft tissue mobilization/cupping (prone): right calf, gastroc, soleus    Provider Interactions With Patient:   Added therapeutic exercises as documented, with cueing provided throughout performance to ensure correct technique during exercise.  Verbally confirmed the patient's next appt date and time to ensure consistency with physical therapy attendance.     Assessment: Patient was able to tolerate BOSU or \"half ball\" this visit better without stiffening of the ankle compared to a few visits ago, also reported \"feeling better\" while completing this time. Patient continues to present with reduced site of tissue adhesions throughout calf, mostly noted through peroneals and anterior lower leg at this time. Added standing toe raises this day to address with strengthening for this area. Patient would benefit from adding DLTR to HEP this day and reassessing next visit with PT to update for final HEP and DC at that time, as patient and therapist are in agreement based on progress and consistency with HEP and attendance. Plan to DC next visit.    Goals:   Short-Term Goals:  Patient will discontinue use of CAM walking boot for daily ambulation. Timeframe: 2-3 visits.   Patient will improve ankle mobility, strength, and endurance to facilitate ability to stand for 2 hour(s) daily for community integration. Timeframe: 4 visits.  Long-Term Goals:  Patient will improve ankle mobility, strength, and endurance to facilitate walking distances of 1 mile(s) daily for household and community ambulation. Timeframe: 6 visits.  Patient will improve ankle mobility and strength for reciprocal stair navigation pattern with no asymmetries for ascending and descending a flight of stairs daily for household and community integration. Timeframe: 6 visits.  Patient will improve ankle mobility,  strength, and endurance to facilitate walking distances of 2 mile(s) daily for household and community ambulation. Timeframe: 8 visits.  Patient will improve ankle mobility and strength for reciprocal stair navigation pattern with no asymmetries for ascending and descending 3 flights of stairs daily for household and community integrtion. Timeframe: 8 visits.  The patient will improve ankle strength and stability to facilitate navigating uneven terrain for hiking for general health and fitness. Timeframe: 12 visits  Patient will improve LEFS score by at least 9 points to indicate a true change in improved function for ADL's and restoring PLF. Timeframe: 12 visits.    Plan: Continue to progress calf and ankle stabilization strengthening.       Charges: MT x 1, Therex x 1, NMR x 1        Total Timed Treatment: 40 min    Total Treatment Time: 40 min

## 2025-01-02 ENCOUNTER — APPOINTMENT (OUTPATIENT)
Dept: PHYSICAL THERAPY | Age: 65
End: 2025-01-02
Attending: FAMILY MEDICINE
Payer: MEDICARE

## 2025-01-06 ENCOUNTER — APPOINTMENT (OUTPATIENT)
Dept: PHYSICAL THERAPY | Age: 65
End: 2025-01-06
Attending: FAMILY MEDICINE
Payer: MEDICARE

## 2025-01-15 ENCOUNTER — OFFICE VISIT (OUTPATIENT)
Dept: PHYSICAL THERAPY | Age: 65
End: 2025-01-15
Attending: FAMILY MEDICINE
Payer: MEDICARE

## 2025-01-15 PROCEDURE — 97140 MANUAL THERAPY 1/> REGIONS: CPT

## 2025-01-15 PROCEDURE — 97110 THERAPEUTIC EXERCISES: CPT

## 2025-01-15 PROCEDURE — 97112 NEUROMUSCULAR REEDUCATION: CPT

## 2025-01-15 NOTE — PATIENT INSTRUCTIONS
Thank you for coming to your physical therapy appt! During your appt today you were issued a home exercise program with a printed handout from [email or web address]. Your home exercise program can also be accessed using the information below. The selected exercises are meant to supplement the treatment you received and reinforce your progress made in physical therapy. Please complete these exercises as instructed by your physical therapist. It is important to stay consistent with your exercises to maximize your recovery!       Access Code: [email or web address]  URL: [email or web address]  Date: 01/15/2025  Prepared by: Laura Mario    Exercises  - Gastroc Stretch on Wall  - 1 x daily - 7 x weekly - 1 sets - 2 reps - 30\" hold  - Soleus Stretch on Wall  - 1 x daily - 7 x weekly - 1 sets - 2 reps - 30\" hold  - Gastroc Stretch on Step  - 1 x daily - 7 x weekly - 1 sets - 2 reps - 30\" hold  - Single Leg Heel Raise with Counter Support  - 1 x daily - 7 x weekly - 2 sets - 10 reps

## 2025-01-15 NOTE — PROGRESS NOTES
PHYSICAL THERAPY DISCHARGE:      Date of Service: 12/26/2024  Dx: Closed fracture of right ankle with routine healing, subsequent encounter (S82.261D), Closed nondisplaced fracture of lateral malleolus of right fibula with routine healing, subsequent encounter (S82.64XD), Sprain of anterior talofibular ligament of right ankle, subsequent encounter (S93.781D)             Insurance: MEDICARE PART A&B  Insurance Limits: N/A  Visit #: 9  Authorized # of Visits: 12 recommended  POC/Auth Expiration: N/A  Date of Last PN: 11/20/24 (Visit #1)  Authorizing Physician/Provider: Helene Ceballos MD visit: None Scheduled  Fall Risk: Standard         Precautions: None            Subjective: The patient reports that she is still with some pain when going down stairs.     Objective:     Pain/Symptom Presentation:    Resting pain pre-tx: 0/10  Pain at worst: 2-3/10     Outcomes Measure(s):   LEFS: 92.5% function     ROM:  Ankle Motion PROM AROM    Right Left Right Left   Ankle OKC DF (knee straight) N/A N/A 10 10   Ankle OKC PF N/A N/A 55 55   Ankle CKC DF (knee bent) N/A N/A 43 40   *indicates activity was associated with pain     Strength/MMT:   Ankle Motion Strength    Right Left   Ankle OKC DF 5/5 5/5   Ankle OKC PF 5/5 5/5   Ankle OKC INV/forefoot ADD 5/5 5/5   Ankle OKC EV/forefoot ABD 5/5 5/5   DLHR 20 reps   SLHR 20 reps 20 reps   *indicates activity was associated with pain     Balance:   SLS: x 30\" (B)     HEP:   Access Code: [email or web address]  URL: [email or web address]  Date: 01/15/2025  Prepared by: Laura Mario    Exercises  - Gastroc Stretch on Wall  - 1 x daily - 7 x weekly - 1 sets - 2 reps - 30\" hold  - Soleus Stretch on Wall  - 1 x daily - 7 x weekly - 1 sets - 2 reps - 30\" hold  - Gastroc Stretch on Step  - 1 x daily - 7 x weekly - 1 sets - 2 reps - 30\" hold  - Single Leg Heel Raise with Counter Support  - 1 x daily - 7 x weekly - 2 sets - 10 reps    Treatment:    Therapeutic Exercise: x 15min  Lunging  Calf Stretch (Gastroc): x 30\" ea R/L  Lunging Calf Stretch (Soleus): x 30\" ea R/L  Calf stretch on edge of step: x 30\" (B)   SLHR: 2 x 10 (B)   HEP update: Reviewed new HEP handout with new exercises and progressions, provided verbal and written instructions/cueing for proper technique and common errors/compensations as needed. Reviewed the importance of compliance with home exercise program to facilitate recovery and meet long-term goals.      Neuromuscular Re-education: x 15min  Anterior BOSU Lunges x 10 ea R/L  Tandem Stance on airex: 2 x 30\" (B)   BOSU step-up: 1 x 10 (B), blue side up   BOSU lateral step-up-and-over: 1 x 10 (B), blue side up  SLS: 2 x 30\" (B)   Manual Therapy: x 10min  Soft tissue mobilization/cupping (prone): right calf, gastroc, soleus    Provider Interactions With Patient:   Added therapeutic exercises as documented, with cueing provided throughout performance to ensure correct technique during exercise.  Verbally confirmed the patient's next appt date and time to ensure consistency with physical therapy attendance.     Assessment: Abby  is a 64 year old female that presents to physical therapy evaluation s/p (R) distal lateral malleolus fracture and inversion ankle sprain 10/19/24 when she tripped on uneven [place]. The patient has been seen for 9 sessions in physical therapy. She demonstrates normalized ankle mobility and is with strength deficits only with SLHR on her involved side. The patient has normalized tolerance to prolonged standing, walking long distances, navigating uneven terrain. The patient's only complaint is a pulling sensation over the achilles tendon when controlling her descent onto her involved LE from a step, which she was educated is likely due to some remaining calf strength deficits. The patient has met all short and long-term goals. She was issued a HEP to address her minor remaining deficits, which I'm confident will resolve with compliance with her HEP. The  patient will be discharged from PT at this time.     Goals:   Short-Term Goals:  Patient will discontinue use of CAM walking boot for daily ambulation. Timeframe: 2-3 visits. (MET 1/15/25)  Patient will improve ankle mobility, strength, and endurance to facilitate ability to stand for 2 hour(s) daily for community integration. Timeframe: 4 visits.  Long-Term Goals: (MET 1/15/25)  Patient will improve ankle mobility, strength, and endurance to facilitate walking distances of 1 mile(s) daily for household and community ambulation. Timeframe: 6 visits. (MET 1/15/25)  Patient will improve ankle mobility and strength for reciprocal stair navigation pattern with no asymmetries for ascending and descending a flight of stairs daily for household and community integration. Timeframe: 6 visits. (MET 1/15/25)  Patient will improve ankle mobility, strength, and endurance to facilitate walking distances of 2 mile(s) daily for household and community ambulation. Timeframe: 8 visits. (MET 1/15/25)  Patient will improve ankle mobility and strength for reciprocal stair navigation pattern with no asymmetries for ascending and descending 3 flights of stairs daily for household and community integrtion. Timeframe: 8 visits. (MET 1/15/25)  The patient will improve ankle strength and stability to facilitate navigating uneven terrain for hiking for general health and fitness. Timeframe: 12 visits (MET 1/15/25)  Patient will improve LEFS score by at least 9 points to indicate a true change in improved function for ADL's and restoring PLF. Timeframe: 12 visits. (MET 1/15/25)    Plan: Patient will be discharged from PT to continue with HEP.       Charges: MT x 1, Therex x 1, NMR x 1        Total Timed Treatment: 40 min    Total Treatment Time: 40 min

## 2025-05-02 ENCOUNTER — PATIENT MESSAGE (OUTPATIENT)
Dept: FAMILY MEDICINE CLINIC | Facility: CLINIC | Age: 65
End: 2025-05-02

## 2025-05-02 NOTE — TELEPHONE ENCOUNTER
albuterol 108 (90 Base) MCG/ACT Inhalation Aero Soln     Asthma & COPD Medication Protocol [id number]/02/2025 12:46 PM   Protocol Details ACT Score greater than or equal to 20    Appointment in past 6 or next 3 months    ACT recorded in the last 12 months    Medication is active on med list      Last office visit:  px: 8/21/24  No future appointments.  Last filled: 9/19/23 1 each  Last labs: 8/21/24 A1C, CMP & HCV AB  Last BP:   BP Readings from Last 3 Encounters:   10/19/24 126/83   08/21/24 110/72   09/19/23 110/78

## 2025-05-03 RX ORDER — ALBUTEROL SULFATE 90 UG/1
2 INHALANT RESPIRATORY (INHALATION) EVERY 4 HOURS PRN
Qty: 1 EACH | Refills: 0 | Status: SHIPPED | OUTPATIENT
Start: 2025-05-03

## 2025-05-03 NOTE — TELEPHONE ENCOUNTER
See My Chart message    Per patient has seasonal induced asthma and only needs certain periods of time. Going out town to an area with more triggers.

## 2025-06-02 ENCOUNTER — PATIENT MESSAGE (OUTPATIENT)
Dept: FAMILY MEDICINE CLINIC | Facility: CLINIC | Age: 65
End: 2025-06-02

## 2025-08-12 ENCOUNTER — OFFICE VISIT (OUTPATIENT)
Dept: FAMILY MEDICINE CLINIC | Facility: CLINIC | Age: 65
End: 2025-08-12

## 2025-08-12 ENCOUNTER — LABORATORY ENCOUNTER (OUTPATIENT)
Dept: LAB | Age: 65
End: 2025-08-12
Attending: FAMILY MEDICINE

## 2025-08-12 VITALS
HEART RATE: 64 BPM | RESPIRATION RATE: 16 BRPM | WEIGHT: 178.13 LBS | TEMPERATURE: 98 F | OXYGEN SATURATION: 95 % | DIASTOLIC BLOOD PRESSURE: 70 MMHG | HEIGHT: 64.5 IN | BODY MASS INDEX: 30.04 KG/M2 | SYSTOLIC BLOOD PRESSURE: 120 MMHG

## 2025-08-12 DIAGNOSIS — Z12.31 BREAST CANCER SCREENING BY MAMMOGRAM: ICD-10-CM

## 2025-08-12 DIAGNOSIS — L71.9 ROSACEA: ICD-10-CM

## 2025-08-12 DIAGNOSIS — R73.03 PRE-DIABETES: ICD-10-CM

## 2025-08-12 DIAGNOSIS — G43.009 MIGRAINE WITHOUT AURA AND WITHOUT STATUS MIGRAINOSUS, NOT INTRACTABLE: ICD-10-CM

## 2025-08-12 DIAGNOSIS — K21.9 GASTROESOPHAGEAL REFLUX DISEASE WITHOUT ESOPHAGITIS: ICD-10-CM

## 2025-08-12 DIAGNOSIS — Z78.0 POSTMENOPAUSAL ESTROGEN DEFICIENCY: ICD-10-CM

## 2025-08-12 DIAGNOSIS — R53.83 OTHER FATIGUE: ICD-10-CM

## 2025-08-12 DIAGNOSIS — Z00.00 MEDICARE ANNUAL WELLNESS VISIT, SUBSEQUENT: ICD-10-CM

## 2025-08-12 DIAGNOSIS — Z83.719 FAMILY HISTORY OF COLONIC POLYPS: ICD-10-CM

## 2025-08-12 DIAGNOSIS — N95.2 POSTMENOPAUSAL ATROPHIC VAGINITIS: ICD-10-CM

## 2025-08-12 DIAGNOSIS — M17.11 PRIMARY OSTEOARTHRITIS OF RIGHT KNEE: ICD-10-CM

## 2025-08-12 DIAGNOSIS — I87.2 VENOUS INSUFFICIENCY OF BOTH LOWER EXTREMITIES: ICD-10-CM

## 2025-08-12 DIAGNOSIS — R53.82 CHRONIC FATIGUE: ICD-10-CM

## 2025-08-12 DIAGNOSIS — Z00.00 ENCOUNTER FOR ANNUAL HEALTH EXAMINATION: ICD-10-CM

## 2025-08-12 DIAGNOSIS — J30.89 SEASONAL ALLERGIC RHINITIS DUE TO OTHER ALLERGIC TRIGGER: Primary | ICD-10-CM

## 2025-08-12 LAB
ALBUMIN SERPL-MCNC: 4.5 G/DL (ref 3.2–4.8)
ALBUMIN/GLOB SERPL: 1.5 (ref 1–2)
ALP LIVER SERPL-CCNC: 73 U/L (ref 55–142)
ALT SERPL-CCNC: 11 U/L (ref 10–49)
ANION GAP SERPL CALC-SCNC: 9 MMOL/L (ref 0–18)
AST SERPL-CCNC: 18 U/L (ref ?–34)
BASOPHILS # BLD AUTO: 0.04 X10(3) UL (ref 0–0.2)
BASOPHILS NFR BLD AUTO: 0.8 %
BILIRUB SERPL-MCNC: 0.7 MG/DL (ref 0.2–1.1)
BUN BLD-MCNC: 12 MG/DL (ref 9–23)
CALCIUM BLD-MCNC: 9.9 MG/DL (ref 8.7–10.6)
CHLORIDE SERPL-SCNC: 105 MMOL/L (ref 98–112)
CO2 SERPL-SCNC: 27 MMOL/L (ref 21–32)
CREAT BLD-MCNC: 0.95 MG/DL (ref 0.55–1.02)
EGFRCR SERPLBLD CKD-EPI 2021: 66 ML/MIN/1.73M2 (ref 60–?)
EOSINOPHIL # BLD AUTO: 0.12 X10(3) UL (ref 0–0.7)
EOSINOPHIL NFR BLD AUTO: 2.3 %
ERYTHROCYTE [DISTWIDTH] IN BLOOD BY AUTOMATED COUNT: 13.1 %
FASTING STATUS PATIENT QL REPORTED: YES
GLOBULIN PLAS-MCNC: 3.1 G/DL (ref 2–3.5)
GLUCOSE BLD-MCNC: 90 MG/DL (ref 70–99)
HCT VFR BLD AUTO: 41.8 % (ref 35–48)
HGB BLD-MCNC: 13.9 G/DL (ref 12–16)
IMM GRANULOCYTES # BLD AUTO: 0.01 X10(3) UL (ref 0–1)
IMM GRANULOCYTES NFR BLD: 0.2 %
LYMPHOCYTES # BLD AUTO: 1.65 X10(3) UL (ref 1–4)
LYMPHOCYTES NFR BLD AUTO: 32 %
MCH RBC QN AUTO: 30.4 PG (ref 26–34)
MCHC RBC AUTO-ENTMCNC: 33.3 G/DL (ref 31–37)
MCV RBC AUTO: 91.5 FL (ref 80–100)
MONOCYTES # BLD AUTO: 0.43 X10(3) UL (ref 0.1–1)
MONOCYTES NFR BLD AUTO: 8.3 %
NEUTROPHILS # BLD AUTO: 2.9 X10 (3) UL (ref 1.5–7.7)
NEUTROPHILS # BLD AUTO: 2.9 X10(3) UL (ref 1.5–7.7)
NEUTROPHILS NFR BLD AUTO: 56.4 %
OSMOLALITY SERPL CALC.SUM OF ELEC: 291 MOSM/KG (ref 275–295)
PLATELET # BLD AUTO: 314 10(3)UL (ref 150–450)
POTASSIUM SERPL-SCNC: 4.2 MMOL/L (ref 3.5–5.1)
PROT SERPL-MCNC: 7.6 G/DL (ref 5.7–8.2)
RBC # BLD AUTO: 4.57 X10(6)UL (ref 3.8–5.3)
SODIUM SERPL-SCNC: 141 MMOL/L (ref 136–145)
TSI SER-ACNC: 2.54 UIU/ML (ref 0.55–4.78)
WBC # BLD AUTO: 5.2 X10(3) UL (ref 4–11)

## 2025-08-12 PROCEDURE — 80053 COMPREHEN METABOLIC PANEL: CPT

## 2025-08-12 PROCEDURE — 85025 COMPLETE CBC W/AUTO DIFF WBC: CPT

## 2025-08-12 PROCEDURE — G0439 PPPS, SUBSEQ VISIT: HCPCS | Performed by: FAMILY MEDICINE

## 2025-08-12 PROCEDURE — 99499 UNLISTED E&M SERVICE: CPT | Performed by: FAMILY MEDICINE

## 2025-08-12 PROCEDURE — 99214 OFFICE O/P EST MOD 30 MIN: CPT | Performed by: FAMILY MEDICINE

## 2025-08-12 PROCEDURE — 84443 ASSAY THYROID STIM HORMONE: CPT

## 2025-08-12 PROCEDURE — 36415 COLL VENOUS BLD VENIPUNCTURE: CPT

## 2025-08-12 RX ORDER — DOXYCYCLINE HYCLATE 100 MG/1
100 TABLET, DELAYED RELEASE ORAL 2 TIMES DAILY
Qty: 90 TABLET | Refills: 3 | Status: SHIPPED | OUTPATIENT
Start: 2025-08-12

## 2025-08-21 ENCOUNTER — HOSPITAL ENCOUNTER (OUTPATIENT)
Dept: MAMMOGRAPHY | Age: 65
Discharge: HOME | End: 2025-08-21
Attending: FAMILY MEDICINE

## 2025-08-21 DIAGNOSIS — Z12.31 BREAST CANCER SCREENING BY MAMMOGRAM: ICD-10-CM

## 2025-08-21 PROCEDURE — 77063 BREAST TOMOSYNTHESIS BI: CPT | Performed by: FAMILY MEDICINE

## 2025-08-21 PROCEDURE — 77067 SCR MAMMO BI INCL CAD: CPT | Performed by: FAMILY MEDICINE

## (undated) DIAGNOSIS — R74.8 ELEVATED LIVER ENZYMES: Primary | ICD-10-CM

## (undated) NOTE — LETTER
ASTHMA ACTION PLAN for Nallely Wharton     : 1960     Date: 10/17/2018  Provider:  Shellie Florentino DO  Phone for doctor or clinic: [address] [place] [address], [address] [place] [id number] [phone]    ACT [place] Suarez

## (undated) NOTE — MR AVS SNAPSHOT
[facility]  [address] [facility] [address]  [phone]               Thank you for choosing us for your health care visit with Debra Bruce. Robbie Mccarty DO.   We are glad to serve you and happy to provide you with this sum Omeprazole 40 MG Cpdr   Take 1 capsule (40 mg total) by mouth daily.                 Where to Get Your Medications      These medications were sent to [place] [id number] [address],  [facility] [address] [place] [address] ([address]), [phone] HOW TO GET STARTED: HOW TO STAY MOTIVATED:   Start activities slowly and build up over time Do what you like   Get your heart pumping – brisk walking, biking, swimming Even 10 minute increments are effective and add up over the week   2 ½ hours per week –